# Patient Record
Sex: MALE | Race: WHITE | URBAN - METROPOLITAN AREA
[De-identification: names, ages, dates, MRNs, and addresses within clinical notes are randomized per-mention and may not be internally consistent; named-entity substitution may affect disease eponyms.]

---

## 2019-08-14 LAB
ALBUMIN SERPL-MCNC: 4.8 G/DL (ref 3.5–4.6)
ALP BLD-CCNC: 88 U/L (ref 35–104)
ALT SERPL-CCNC: 18 U/L (ref 0–41)
AST SERPL-CCNC: 20 U/L (ref 0–40)
BASOPHILS ABSOLUTE: 0.2 K/UL (ref 0–0.2)
BASOPHILS RELATIVE PERCENT: 2.2 %
BILIRUB SERPL-MCNC: 0.3 MG/DL (ref 0.2–0.7)
BILIRUBIN DIRECT: <0.2 MG/DL (ref 0–0.4)
BILIRUBIN, INDIRECT: ABNORMAL MG/DL (ref 0–0.6)
EOSINOPHILS ABSOLUTE: 0.2 K/UL (ref 0–0.7)
EOSINOPHILS RELATIVE PERCENT: 2.5 %
HCT VFR BLD CALC: 47.7 % (ref 42–52)
HEMOGLOBIN: 17 G/DL (ref 14–18)
LYMPHOCYTES ABSOLUTE: 3.3 K/UL (ref 1–4.8)
LYMPHOCYTES RELATIVE PERCENT: 34.3 %
MCH RBC QN AUTO: 33.3 PG (ref 27–31.3)
MCHC RBC AUTO-ENTMCNC: 35.6 % (ref 33–37)
MCV RBC AUTO: 93.4 FL (ref 80–100)
MONOCYTES ABSOLUTE: 0.8 K/UL (ref 0.2–0.8)
MONOCYTES RELATIVE PERCENT: 8.3 %
NEUTROPHILS ABSOLUTE: 5 K/UL (ref 1.4–6.5)
NEUTROPHILS RELATIVE PERCENT: 52.7 %
PDW BLD-RTO: 12.7 % (ref 11.5–14.5)
PLATELET # BLD: 318 K/UL (ref 130–400)
RBC # BLD: 5.1 M/UL (ref 4.7–6.1)
TOTAL PROTEIN: 7.8 G/DL (ref 6.3–8)
WBC # BLD: 9.5 K/UL (ref 4.8–10.8)

## 2019-10-01 ENCOUNTER — TELEPHONE (OUTPATIENT)
Dept: NEUROLOGY | Age: 49
End: 2019-10-01

## 2019-10-08 ENCOUNTER — TELEPHONE (OUTPATIENT)
Dept: NEUROLOGY | Age: 49
End: 2019-10-08

## 2019-10-09 RX ORDER — LEVETIRACETAM 500 MG/1
TABLET ORAL
Qty: 150 TABLET | Refills: 3 | Status: SHIPPED | OUTPATIENT
Start: 2019-10-09 | End: 2020-02-06 | Stop reason: SDUPTHER

## 2019-12-26 DIAGNOSIS — R56.9 SEIZURES (HCC): Primary | ICD-10-CM

## 2019-12-26 RX ORDER — CLOBAZAM 10 MG/1
TABLET ORAL
Qty: 90 TABLET | Refills: 1 | Status: SHIPPED | OUTPATIENT
Start: 2019-12-26 | End: 2020-02-12 | Stop reason: SDUPTHER

## 2019-12-26 RX ORDER — CLOBAZAM 10 MG/1
TABLET ORAL
Qty: 90 TABLET | Refills: 1 | Status: SHIPPED | OUTPATIENT
Start: 2019-12-26 | End: 2019-12-26 | Stop reason: CLARIF

## 2020-02-06 RX ORDER — LEVETIRACETAM 500 MG/1
TABLET ORAL
Qty: 150 TABLET | Refills: 3 | Status: SHIPPED | OUTPATIENT
Start: 2020-02-06 | End: 2020-02-12 | Stop reason: SDUPTHER

## 2020-02-06 NOTE — TELEPHONE ENCOUNTER
Requested Prescriptions     Pending Prescriptions Disp Refills    levETIRAcetam (KEPPRA) 500 MG tablet 150 tablet 3     Si and a half tablets by mouth twice daily

## 2020-02-12 ENCOUNTER — OFFICE VISIT (OUTPATIENT)
Dept: NEUROLOGY | Age: 50
End: 2020-02-12
Payer: COMMERCIAL

## 2020-02-12 VITALS — HEART RATE: 90 BPM | WEIGHT: 171.9 LBS | SYSTOLIC BLOOD PRESSURE: 145 MMHG | DIASTOLIC BLOOD PRESSURE: 94 MMHG

## 2020-02-12 PROBLEM — R56.9 SEIZURES (HCC): Status: ACTIVE | Noted: 2020-02-12

## 2020-02-12 PROCEDURE — 99214 OFFICE O/P EST MOD 30 MIN: CPT | Performed by: PSYCHIATRY & NEUROLOGY

## 2020-02-12 PROCEDURE — 4004F PT TOBACCO SCREEN RCVD TLK: CPT | Performed by: PSYCHIATRY & NEUROLOGY

## 2020-02-12 PROCEDURE — G8484 FLU IMMUNIZE NO ADMIN: HCPCS | Performed by: PSYCHIATRY & NEUROLOGY

## 2020-02-12 PROCEDURE — G8421 BMI NOT CALCULATED: HCPCS | Performed by: PSYCHIATRY & NEUROLOGY

## 2020-02-12 PROCEDURE — G8427 DOCREV CUR MEDS BY ELIG CLIN: HCPCS | Performed by: PSYCHIATRY & NEUROLOGY

## 2020-02-12 RX ORDER — LEVETIRACETAM 500 MG/1
TABLET ORAL
Qty: 180 TABLET | Refills: 3 | Status: SHIPPED | OUTPATIENT
Start: 2020-02-12 | End: 2020-07-07 | Stop reason: SDUPTHER

## 2020-02-12 RX ORDER — LAMOTRIGINE 200 MG/1
TABLET ORAL
COMMUNITY
Start: 2020-01-27 | End: 2020-03-24 | Stop reason: SDUPTHER

## 2020-02-12 RX ORDER — CLOBAZAM 10 MG/1
TABLET ORAL
Qty: 90 TABLET | Refills: 0 | Status: SHIPPED | OUTPATIENT
Start: 2020-02-12 | End: 2020-02-12

## 2020-02-12 RX ORDER — CLOBAZAM 10 MG/1
TABLET ORAL
Qty: 90 TABLET | Refills: 3 | Status: SHIPPED | OUTPATIENT
Start: 2020-02-12 | End: 2020-06-17 | Stop reason: SDUPTHER

## 2020-02-12 ASSESSMENT — ENCOUNTER SYMPTOMS
VOMITING: 0
BACK PAIN: 0
CHOKING: 0
NAUSEA: 0
SHORTNESS OF BREATH: 0
PHOTOPHOBIA: 0
TROUBLE SWALLOWING: 0

## 2020-02-12 NOTE — PROGRESS NOTES
Subjective:      Patient ID: Di Jaramillo is a 51 y.o. male who presents today for:  Chief Complaint   Patient presents with    Follow-up     Patient states he is having about 1-2 seizures still a month. Patient states he has been getting a migraine frequently they can last from 5hour till the next day at times. HPI 49-year right-handed gentleman with a history of intractable epilepsy. Patient has bilateral representation of focus and and nonoperative candidate for surgical intervention for epilepsy. Patient is now on 30 mg of Onfi and 1250 mg twice a day of Keppra and 200 mg twice a day of Lamictal.  He is not able to tolerate any other medications are high doses been tried on every medication available in the market. Pt had  One generalized seizure in December and others aremild partial seizures. His  main issues appears to be severe migraine headaches which he has not responded to any medications or tried Imitrex. He was on Aimovig only short-term. He did respond to Aimovig at least    No past medical history on file. No past surgical history on file.   Social History     Socioeconomic History    Marital status:      Spouse name: Not on file    Number of children: Not on file    Years of education: Not on file    Highest education level: Not on file   Occupational History    Not on file   Social Needs    Financial resource strain: Not on file    Food insecurity:     Worry: Not on file     Inability: Not on file    Transportation needs:     Medical: Not on file     Non-medical: Not on file   Tobacco Use    Smoking status: Never Smoker    Smokeless tobacco: Never Used   Substance and Sexual Activity    Alcohol use: Not on file    Drug use: Not on file    Sexual activity: Not on file   Lifestyle    Physical activity:     Days per week: Not on file     Minutes per session: Not on file    Stress: Not on file   Relationships    Social connections:     Talks on phone: Not on file Gets together: Not on file     Attends Sabianist service: Not on file     Active member of club or organization: Not on file     Attends meetings of clubs or organizations: Not on file     Relationship status: Not on file    Intimate partner violence:     Fear of current or ex partner: Not on file     Emotionally abused: Not on file     Physically abused: Not on file     Forced sexual activity: Not on file   Other Topics Concern    Not on file   Social History Narrative    Not on file     No family history on file. Allergies   Allergen Reactions    Zonisamide Other (See Comments)         Review of Systems   Constitutional: Negative for fever. HENT: Negative for ear pain, tinnitus and trouble swallowing. Eyes: Negative for photophobia and visual disturbance. Respiratory: Negative for choking and shortness of breath. Cardiovascular: Negative for chest pain and palpitations. Gastrointestinal: Negative for nausea and vomiting. Musculoskeletal: Negative for back pain, gait problem, joint swelling, myalgias, neck pain and neck stiffness. Neurological: Negative for dizziness, tremors, seizures, syncope, facial asymmetry, speech difficulty, weakness, light-headedness, numbness and headaches. Psychiatric/Behavioral: Negative for behavioral problems, confusion, hallucinations and sleep disturbance. Objective:   BP (!) 145/94 (Site: Left Upper Arm, Position: Sitting, Cuff Size: Medium Adult)   Pulse 90   Wt 171 lb 14.4 oz (78 kg)     Physical Exam  Vitals signs reviewed. Eyes:      Pupils: Pupils are equal, round, and reactive to light. Neck:      Musculoskeletal: Normal range of motion. Cardiovascular:      Rate and Rhythm: Normal rate and regular rhythm. Heart sounds: No murmur. Pulmonary:      Effort: Pulmonary effort is normal.      Breath sounds: Normal breath sounds. Musculoskeletal: Normal range of motion.    Neurological:      Mental Status: He is alert and oriented to medication we have tried him on multiple medications none of this is helped in terms of his side effects. He has occasional seizures and he had a generalized seizure in December. Therefore recommended that we increase the Keppra to 1500 mg twice a day and try and maximize this further if tolerated. Patient has had a complete mapping for surgical intervention is bilateral representation not a candidate for surgical interventions for epilepsy. We have to relocate this at some point in time to see if he can find one large focus to take care of. Patient has intractable migraines I recommended that we reenter the Meghann Donald and add Ubrelvi for backup as a rescue as he is not quite responsive to sumatriptan. Plan:      No orders of the defined types were placed in this encounter. Orders Placed This Encounter   Medications    DISCONTD: cloBAZam (ONFI) 10 MG TABS tablet     Sig: 3 tablets po at bedtime     Dispense:  90 tablet     Refill:  0    cloBAZam (ONFI) 10 MG TABS tablet     Sig: 3 tablets po at bedtime     Dispense:  90 tablet     Refill:  3       Return in about 4 months (around 6/12/2020).       Meli Hogan MD

## 2020-02-18 ENCOUNTER — TELEPHONE (OUTPATIENT)
Dept: NEUROLOGY | Age: 50
End: 2020-02-18

## 2020-03-25 RX ORDER — LAMOTRIGINE 200 MG/1
200 TABLET ORAL 2 TIMES DAILY
Qty: 60 TABLET | Refills: 5 | Status: SHIPPED | OUTPATIENT
Start: 2020-03-25 | End: 2020-08-20 | Stop reason: SDUPTHER

## 2020-06-17 ENCOUNTER — OFFICE VISIT (OUTPATIENT)
Dept: NEUROLOGY | Age: 50
End: 2020-06-17
Payer: COMMERCIAL

## 2020-06-17 VITALS — HEART RATE: 99 BPM | WEIGHT: 171 LBS | DIASTOLIC BLOOD PRESSURE: 95 MMHG | SYSTOLIC BLOOD PRESSURE: 135 MMHG

## 2020-06-17 DIAGNOSIS — R56.9 SEIZURES (HCC): ICD-10-CM

## 2020-06-17 PROBLEM — M20.011 MALLET FINGER OF RIGHT HAND: Status: ACTIVE | Noted: 2019-12-27

## 2020-06-17 PROBLEM — R73.01 IMPAIRED FASTING GLUCOSE: Status: ACTIVE | Noted: 2020-06-17

## 2020-06-17 PROBLEM — Z87.891 FORMER SMOKER: Status: ACTIVE | Noted: 2020-06-17

## 2020-06-17 PROBLEM — E78.2 MIXED HYPERLIPIDEMIA: Status: ACTIVE | Noted: 2020-06-17

## 2020-06-17 PROBLEM — J44.89 ASTHMA-CHRONIC OBSTRUCTIVE PULMONARY DISEASE OVERLAP SYNDROME (HCC): Status: ACTIVE | Noted: 2020-06-17

## 2020-06-17 PROBLEM — G43.719 INTRACTABLE CHRONIC MIGRAINE WITHOUT AURA AND WITHOUT STATUS MIGRAINOSUS: Status: ACTIVE | Noted: 2020-06-17

## 2020-06-17 PROBLEM — M25.641 FINGER STIFFNESS, RIGHT: Status: ACTIVE | Noted: 2019-12-27

## 2020-06-17 PROBLEM — G56.00 CARPAL TUNNEL SYNDROME: Status: ACTIVE | Noted: 2020-06-17

## 2020-06-17 PROBLEM — J44.9 ASTHMA-CHRONIC OBSTRUCTIVE PULMONARY DISEASE OVERLAP SYNDROME (HCC): Status: ACTIVE | Noted: 2020-06-17

## 2020-06-17 PROBLEM — G40.909 SEIZURE DISORDER (HCC): Status: ACTIVE | Noted: 2018-10-25

## 2020-06-17 LAB
ALBUMIN SERPL-MCNC: 4.7 G/DL (ref 3.5–4.6)
ALP BLD-CCNC: 89 U/L (ref 35–104)
ALT SERPL-CCNC: 22 U/L (ref 0–41)
AST SERPL-CCNC: 20 U/L (ref 0–40)
BASOPHILS ABSOLUTE: 0.1 K/UL (ref 0–0.2)
BASOPHILS RELATIVE PERCENT: 0.7 %
BILIRUB SERPL-MCNC: 0.3 MG/DL (ref 0.2–0.7)
BILIRUBIN DIRECT: <0.2 MG/DL (ref 0–0.4)
BILIRUBIN, INDIRECT: ABNORMAL MG/DL (ref 0–0.6)
EOSINOPHILS ABSOLUTE: 0.1 K/UL (ref 0–0.7)
EOSINOPHILS RELATIVE PERCENT: 1.1 %
HCT VFR BLD CALC: 55.7 % (ref 42–52)
HEMOGLOBIN: 18.7 G/DL (ref 14–18)
LYMPHOCYTES ABSOLUTE: 2.9 K/UL (ref 1–4.8)
LYMPHOCYTES RELATIVE PERCENT: 24.1 %
MCH RBC QN AUTO: 31.9 PG (ref 27–31.3)
MCHC RBC AUTO-ENTMCNC: 33.5 % (ref 33–37)
MCV RBC AUTO: 95.3 FL (ref 80–100)
MONOCYTES ABSOLUTE: 1.1 K/UL (ref 0.2–0.8)
MONOCYTES RELATIVE PERCENT: 9 %
NEUTROPHILS ABSOLUTE: 7.9 K/UL (ref 1.4–6.5)
NEUTROPHILS RELATIVE PERCENT: 65.1 %
PDW BLD-RTO: 12.6 % (ref 11.5–14.5)
PLATELET # BLD: 330 K/UL (ref 130–400)
RBC # BLD: 5.84 M/UL (ref 4.7–6.1)
TOTAL PROTEIN: 7.5 G/DL (ref 6.3–8)
WBC # BLD: 12.2 K/UL (ref 4.8–10.8)

## 2020-06-17 PROCEDURE — G8421 BMI NOT CALCULATED: HCPCS | Performed by: PSYCHIATRY & NEUROLOGY

## 2020-06-17 PROCEDURE — G8427 DOCREV CUR MEDS BY ELIG CLIN: HCPCS | Performed by: PSYCHIATRY & NEUROLOGY

## 2020-06-17 PROCEDURE — 1036F TOBACCO NON-USER: CPT | Performed by: PSYCHIATRY & NEUROLOGY

## 2020-06-17 PROCEDURE — 99214 OFFICE O/P EST MOD 30 MIN: CPT | Performed by: PSYCHIATRY & NEUROLOGY

## 2020-06-17 RX ORDER — CLOBAZAM 10 MG/1
TABLET ORAL
Qty: 90 TABLET | Refills: 3 | Status: SHIPPED | OUTPATIENT
Start: 2020-06-17 | End: 2020-08-20 | Stop reason: SDUPTHER

## 2020-06-17 RX ORDER — ERENUMAB-AOOE 140 MG/ML
INJECTION, SOLUTION SUBCUTANEOUS
COMMUNITY
Start: 2020-05-21

## 2020-06-17 RX ORDER — SILDENAFIL 25 MG/1
25 TABLET, FILM COATED ORAL PRN
COMMUNITY
Start: 2020-04-03

## 2020-06-17 ASSESSMENT — ENCOUNTER SYMPTOMS
VOMITING: 0
BACK PAIN: 0
CHOKING: 0
PHOTOPHOBIA: 0
TROUBLE SWALLOWING: 0
COLOR CHANGE: 0
SHORTNESS OF BREATH: 0
NAUSEA: 0

## 2020-06-17 NOTE — PROGRESS NOTES
BILITOT 0.3 08/14/2019    ALKPHOS 88 08/14/2019    AST 20 08/14/2019    ALT 18 08/14/2019     Lab Results   Component Value Date    PROTIME 11.8 04/03/2012    INR 1.1 04/03/2012     Lab Results   Component Value Date    TSH 1.152 04/03/2012     No results found for: TRIG, HDL, LDLCALC, LDLDIRECT, LABVLDL  No results found for: LABAMPH, BARBSCNU, LABBENZ, CANNAB, COCAINESCRN, LABMETH, OPIATESCREENURINE, PHENCYCLIDINESCREENURINE, PPXUR, ETOH  No results found for: LITHIUM, DILFRTOT, VALPROATE    Assessment:       Diagnosis Orders   1. Seizure disorder (HCC)     2. Seizures (HCC)  cloBAZam (ONFI) 10 MG TABS tablet    Lamotrigine Level    CBC Auto Differential    Hepatic Function Panel   3. Bilateral carpal tunnel syndrome     4. Intractable chronic migraine without aura and without status migrainosus     Intractable epilepsy with bilateral representation of seizures. Patient is nonsurgical.  We have been tried on multiple medications is now settled on Lamictal 20 mg 3 times a day with Keppra 1500 g twice a day and we had added clobazam.  He is on 30 mg somewhat higher dose than recommended but he is doing somewhat better with the seizure control in the last 4 months he is only had 2 almost grand mal seizures and for seizures which are very minimal.  He is not any side effects of the same. Recommended liver function tests and complete blood count today with the mixed the levels. Patient has intractable migraines and we continue him on Aimovig as well as Dexter Porch  Is just been 3 months we are not quite sure of the response but he feels better somewhat than his previous medications such as triptans. We will keep an eye on his symptoms for now  We await introduction of 2 other new seizure medications coming in the market this month or 2.       Plan:      Orders Placed This Encounter   Procedures    Lamotrigine Level     Standing Status:   Future     Standing Expiration Date:   6/17/2021    CBC Auto Differential Standing Status:   Future     Standing Expiration Date:   6/17/2021    Hepatic Function Panel     Standing Status:   Future     Standing Expiration Date:   6/17/2021     Orders Placed This Encounter   Medications    cloBAZam (ONFI) 10 MG TABS tablet     Sig: 3 tablets po at bedtime     Dispense:  90 tablet     Refill:  3       No follow-ups on file.       Nabil Gerber MD

## 2020-06-19 LAB — LAMOTRIGINE LEVEL: 12.8 UG/ML (ref 2.5–15)

## 2020-07-08 RX ORDER — LEVETIRACETAM 500 MG/1
TABLET ORAL
Qty: 180 TABLET | Refills: 3 | Status: SHIPPED | OUTPATIENT
Start: 2020-07-08 | End: 2020-11-06 | Stop reason: SDUPTHER

## 2020-08-19 ENCOUNTER — TELEPHONE (OUTPATIENT)
Dept: NEUROLOGY | Age: 50
End: 2020-08-19

## 2020-08-20 RX ORDER — CLOBAZAM 10 MG/1
TABLET ORAL
Qty: 90 TABLET | Refills: 3 | Status: SHIPPED | OUTPATIENT
Start: 2020-08-20 | End: 2020-12-18 | Stop reason: SDUPTHER

## 2020-08-20 RX ORDER — LAMOTRIGINE 200 MG/1
200 TABLET ORAL 2 TIMES DAILY
Qty: 60 TABLET | Refills: 5 | Status: SHIPPED | OUTPATIENT
Start: 2020-08-20 | End: 2021-02-19 | Stop reason: SDUPTHER

## 2020-08-20 RX ORDER — UBROGEPANT 50 MG/1
TABLET ORAL
Qty: 10 TABLET | Refills: 3 | Status: SHIPPED | OUTPATIENT
Start: 2020-08-20

## 2020-10-02 ENCOUNTER — TELEPHONE (OUTPATIENT)
Dept: NEUROLOGY | Age: 50
End: 2020-10-02

## 2020-10-02 NOTE — TELEPHONE ENCOUNTER
Submitted pa for aimovig via cover  My meds     [employer] is reviewing your PA request and will respond within 24 hours for Medicaid or up to 72 hours for non-Medicaid plans, based on the required timeframe determined by state or federal regulations. To check for an update later, open this request from your dashboard.       giovani

## 2020-10-19 NOTE — TELEPHONE ENCOUNTER
Mariela 1964       [facility]:[id number];IJSNJX:[id number]; Review Type:Prior Auth; Coverage Start Date:09/19/2020; Coverage End Date:10/19/2021;      Leander Wynn

## 2020-10-29 ENCOUNTER — OFFICE VISIT (OUTPATIENT)
Dept: NEUROLOGY | Age: 50
End: 2020-10-29
Payer: COMMERCIAL

## 2020-10-29 VITALS — SYSTOLIC BLOOD PRESSURE: 142 MMHG | HEART RATE: 82 BPM | DIASTOLIC BLOOD PRESSURE: 100 MMHG | WEIGHT: 175 LBS

## 2020-10-29 PROCEDURE — 99214 OFFICE O/P EST MOD 30 MIN: CPT | Performed by: PSYCHIATRY & NEUROLOGY

## 2020-10-29 PROCEDURE — G8421 BMI NOT CALCULATED: HCPCS | Performed by: PSYCHIATRY & NEUROLOGY

## 2020-10-29 PROCEDURE — 1036F TOBACCO NON-USER: CPT | Performed by: PSYCHIATRY & NEUROLOGY

## 2020-10-29 PROCEDURE — G8484 FLU IMMUNIZE NO ADMIN: HCPCS | Performed by: PSYCHIATRY & NEUROLOGY

## 2020-10-29 PROCEDURE — G8427 DOCREV CUR MEDS BY ELIG CLIN: HCPCS | Performed by: PSYCHIATRY & NEUROLOGY

## 2020-10-29 RX ORDER — FREMANEZUMAB-VFRM 225 MG/1.5ML
225 INJECTION SUBCUTANEOUS
Qty: 1.5 ML | Refills: 6 | Status: SHIPPED | OUTPATIENT
Start: 2020-10-29 | End: 2021-03-04

## 2020-10-29 ASSESSMENT — ENCOUNTER SYMPTOMS
TROUBLE SWALLOWING: 0
NAUSEA: 0
SHORTNESS OF BREATH: 0
VOMITING: 0
PHOTOPHOBIA: 0
BACK PAIN: 0
CHOKING: 0
COLOR CHANGE: 0

## 2020-10-29 NOTE — PROGRESS NOTES
Subjective:      Patient ID: Arsalan Kern is a 53 y.o. male who presents today for:  Chief Complaint   Patient presents with    Follow-up     Pt states that he had a seizure about 3 weeks ago where he lost his thoughts for about 30 seconds and came to and was fine.  Migraine     Pt has not had aimovig since september due to [facility] calling him and telling him that they where not going to send it anymore. There has been no change of insurance or anything at this point. Pt states that he has had about 5-6 migraines since he has not had the injection. With the injection he would get about 1-2 a month. HPI 71-year-old right-handed woman history of intractable seizure disorder with bilateral representation of epilepsy and not a surgical candidate. We have tried him on multiple medications and settled on with Onfi with Keppra and Lamictal.  The patient has had about 4 seizures since last seen one of them appeared to be almost a grand mal seizure. We continue to discuss Epidiolex which she did not use and we are now considering Xcopri. He would not like to change the medication Extavia 6 for migraine headaches and was on a bridge program for Aimovig which helped his headaches he was down from 6 headache days a month to 2 headache days and then they would not refill his prescription he tried [place] and [place] and he did not respond to this he felt that Tylenol was better    He does not want to consider any change in his medications and I will leave the decision to him as has been tried on every anticonvulsant available in the [facility]    No past medical history on file. No past surgical history on file.   Social History     Socioeconomic History    Marital status:      Spouse name: Not on file    Number of children: Not on file    Years of education: Not on file    Highest education level: Not on file   Occupational History    Not on file   Social Needs    Financial resource strain: Not on file  Food insecurity     Worry: Not on file     Inability: Not on file    Transportation needs     Medical: Not on file     Non-medical: Not on file   Tobacco Use    Smoking status: Never Smoker    Smokeless tobacco: Never Used   Substance and Sexual Activity    Alcohol use: Not on file    Drug use: Not on file    Sexual activity: Not on file   Lifestyle    Physical activity     Days per week: Not on file     Minutes per session: Not on file    Stress: Not on file   Relationships    Social connections     Talks on phone: Not on file     Gets together: Not on file     Attends Synagogue service: Not on file     Active member of club or organization: Not on file     Attends meetings of clubs or organizations: Not on file     Relationship status: Not on file    Intimate partner violence     Fear of current or ex partner: Not on file     Emotionally abused: Not on file     Physically abused: Not on file     Forced sexual activity: Not on file   Other Topics Concern    Not on file   Social History Narrative    Not on file     No family history on file. Allergies   Allergen Reactions    Zonisamide Other (See Comments)       Current Outpatient Medications   Medication Sig Dispense Refill    Fremanezumab-vfrm (AJOVY) 225 MG/1.5ML SOAJ Inject 225 mg into the skin every 30 days 1.5 mL 6    cloBAZam (ONFI) 10 MG TABS tablet 3 tablets po at bedtime 90 tablet 3    UBRELVY 50 MG TABS TAKE 1 TABLET BY MOUTH AT ONSET OF MIGRAINE 10 tablet 3    levETIRAcetam (KEPPRA) 500 MG tablet 3    tablet 3    sildenafil (VIAGRA) 25 MG tablet Take 25 mg by mouth as needed      lamoTRIgine (LAMICTAL) 200 MG tablet Take 1 tablet by mouth 2 times daily 60 tablet 5    AIMOVIG 140 MG/ML SOAJ ADM 1 ML SC Q 30 DAYS      Erenumab-aooe (AIMOVIG) 140 MG/ML SOAJ Inject 140 mg into the skin every 30 days 1 pen 11     No current facility-administered medications for this visit.           Review of Systems   Constitutional: Negative 06/17/2020    RBC 5.19 04/03/2012    HGB 18.7 06/17/2020    HCT 55.7 06/17/2020    MCV 95.3 06/17/2020    MCH 31.9 06/17/2020    MCHC 33.5 06/17/2020    RDW 12.6 06/17/2020     06/17/2020    MPV 7.8 04/03/2012     Lab Results   Component Value Date     04/03/2012    K 4.6 04/03/2012     04/03/2012    CO2 33 04/03/2012    BUN 6 04/03/2012    CREATININE 0.92 04/03/2012    GFRAA 109.9 04/03/2012    LABGLOM 90.8 04/03/2012    GLUCOSE 107 04/03/2012    PROT 7.5 06/17/2020    LABALBU 4.7 06/17/2020    LABALBU 5.0 04/03/2012    CALCIUM 9.8 04/03/2012    BILITOT 0.3 06/17/2020    ALKPHOS 89 06/17/2020    AST 20 06/17/2020    ALT 22 06/17/2020     Lab Results   Component Value Date    PROTIME 11.8 04/03/2012    INR 1.1 04/03/2012     Lab Results   Component Value Date    TSH 1.152 04/03/2012     No results found for: TRIG, HDL, LDLCALC, LDLDIRECT, LABVLDL  No results found for: LABAMPH, BARBSCNU, LABBENZ, CANNAB, COCAINESCRN, LABMETH, OPIATESCREENURINE, PHENCYCLIDINESCREENURINE, PPXUR, ETOH  No results found for: LITHIUM, DILFRTOT, VALPROATE    Assessment:       Diagnosis Orders   1. Seizure disorder ([facility] [address]     2. Intractable chronic migraine without aura and without status migrainosus     3. Bilateral carpal tunnel syndrome     Generalized seizure disorder with some response to Onfi. He is on higher dose of Onfi than recommended is on 30 mg and Lamictal and Keppra he is on Keppra 1500 mg twice a day maxed out and lamotrigine 200 mg twice a day. We have tried him on every medication available in the [place] he has bilateral representation of firing of units and therefore not a surgical candidate he has been evaluated for the same. He has just accepted that these are his seizures though we continue to discuss new options one of them was Epidiolex he did not want to do this and we did discuss Ellen Suarez today a new anticonvulsant there is just been released. He will think about this.   Neck soft intractable migraine headaches responsive to Aimovig though insurance company does not cover this anymore we have changed him to [address] as we can give him samples. We will see how he does on this medication he did not respond to Margie Nevaers has tried triptans in the past without response. We will keep him on the same medication review in a few months      Plan:      No orders of the defined types were placed in this encounter. Orders Placed This Encounter   Medications    Fremanezumab-vfrm (AJOVY) 225 MG/1.5ML SOAJ     Sig: Inject 225 mg into the skin every 30 days     Dispense:  1.5 mL     Refill:  6       Return in about 4 months (around 2/28/2021).       Tonny Muñoz MD

## 2020-11-03 ENCOUNTER — CLINICAL DOCUMENTATION (OUTPATIENT)
Dept: NEUROLOGY | Age: 50
End: 2020-11-03

## 2020-11-06 RX ORDER — LEVETIRACETAM 500 MG/1
TABLET ORAL
Qty: 180 TABLET | Refills: 3 | Status: SHIPPED | OUTPATIENT
Start: 2020-11-06 | End: 2021-03-04 | Stop reason: SDUPTHER

## 2020-11-19 RX ORDER — FREMANEZUMAB-VFRM 225 MG/1.5ML
225 INJECTION SUBCUTANEOUS
Qty: 1 PEN | Refills: 0 | COMMUNITY
Start: 2020-11-19 | End: 2020-12-19

## 2020-12-18 RX ORDER — CLOBAZAM 10 MG/1
TABLET ORAL
Qty: 90 TABLET | Refills: 3 | Status: SHIPPED | OUTPATIENT
Start: 2020-12-18 | End: 2021-04-19 | Stop reason: SDUPTHER

## 2021-02-19 RX ORDER — LAMOTRIGINE 200 MG/1
200 TABLET ORAL 2 TIMES DAILY
Qty: 60 TABLET | Refills: 5 | Status: SHIPPED | OUTPATIENT
Start: 2021-02-19 | End: 2021-08-19 | Stop reason: SDUPTHER

## 2021-03-04 ENCOUNTER — OFFICE VISIT (OUTPATIENT)
Dept: NEUROLOGY | Age: 51
End: 2021-03-04
Payer: COMMERCIAL

## 2021-03-04 VITALS — HEART RATE: 85 BPM | DIASTOLIC BLOOD PRESSURE: 86 MMHG | WEIGHT: 174.6 LBS | SYSTOLIC BLOOD PRESSURE: 132 MMHG

## 2021-03-04 DIAGNOSIS — G43.719 INTRACTABLE CHRONIC MIGRAINE WITHOUT AURA AND WITHOUT STATUS MIGRAINOSUS: ICD-10-CM

## 2021-03-04 DIAGNOSIS — G40.909 SEIZURE DISORDER (HCC): Primary | ICD-10-CM

## 2021-03-04 PROCEDURE — 1036F TOBACCO NON-USER: CPT | Performed by: PSYCHIATRY & NEUROLOGY

## 2021-03-04 PROCEDURE — G8427 DOCREV CUR MEDS BY ELIG CLIN: HCPCS | Performed by: PSYCHIATRY & NEUROLOGY

## 2021-03-04 PROCEDURE — G8421 BMI NOT CALCULATED: HCPCS | Performed by: PSYCHIATRY & NEUROLOGY

## 2021-03-04 PROCEDURE — G8484 FLU IMMUNIZE NO ADMIN: HCPCS | Performed by: PSYCHIATRY & NEUROLOGY

## 2021-03-04 PROCEDURE — 99214 OFFICE O/P EST MOD 30 MIN: CPT | Performed by: PSYCHIATRY & NEUROLOGY

## 2021-03-04 RX ORDER — LEVETIRACETAM 500 MG/1
TABLET ORAL
Qty: 180 TABLET | Refills: 3 | Status: SHIPPED | OUTPATIENT
Start: 2021-03-04 | End: 2021-07-02 | Stop reason: SDUPTHER

## 2021-03-04 ASSESSMENT — ENCOUNTER SYMPTOMS
NAUSEA: 0
TROUBLE SWALLOWING: 0
CHOKING: 0
VOMITING: 0
BACK PAIN: 0
SHORTNESS OF BREATH: 0
PHOTOPHOBIA: 0
COLOR CHANGE: 0

## 2021-03-04 NOTE — PROGRESS NOTES
Subjective:      Patient ID: Karina Crowder is a 53 y.o. male who presents today for:  Chief Complaint   Patient presents with    Follow-up     Pt states that he had a little seizure about 2 months go has not had one since. Pt states that he had taken medication that day and it was just a random one.  Migraine     Pt states states that he is getting about 10-12 a month still even with the aimovig. Pt states that when he gets a migraine he takes tylenol and does okay with it. HPI 80-year-old right-handed gentleman history of intractable epilepsy. Patient still continues to breakthrough a few seizures here and there sometimes is. We have gone through the whole cookbook of medications and he does not want to try any more different medications. We had mapping done he is bihemispheric not a surgical candidate either. He now continues on Lamictal 200 mg a day. He is also on Keppra 1500 mg twice a day. Also in the high dose of Onfi at 30 mg daily. Patient has been tried on multiple medications we discussed Epidiolex as well as Xcopri. His other issues appears to be intractable migraine headaches he is on Aimovig which is cut down his headaches by 50% we did try Ubrelvy and he did not respond to this. No past medical history on file. No past surgical history on file.   Social History     Socioeconomic History    Marital status:      Spouse name: Not on file    Number of children: Not on file    Years of education: Not on file    Highest education level: Not on file   Occupational History    Not on file   Social Needs    Financial resource strain: Not on file    Food insecurity     Worry: Not on file     Inability: Not on file    Transportation needs     Medical: Not on file     Non-medical: Not on file   Tobacco Use    Smoking status: Never Smoker    Smokeless tobacco: Never Used   Substance and Sexual Activity    Alcohol use: Not on file    Drug use: Not on file    Sexual activity: Not on file   Lifestyle    Physical activity     Days per week: Not on file     Minutes per session: Not on file    Stress: Not on file   Relationships    Social connections     Talks on phone: Not on file     Gets together: Not on file     Attends Scientologist service: Not on file     Active member of club or organization: Not on file     Attends meetings of clubs or organizations: Not on file     Relationship status: Not on file    Intimate partner violence     Fear of current or ex partner: Not on file     Emotionally abused: Not on file     Physically abused: Not on file     Forced sexual activity: Not on file   Other Topics Concern    Not on file   Social History Narrative    Not on file     No family history on file. Allergies   Allergen Reactions    Zonisamide Other (See Comments)       Current Outpatient Medications   Medication Sig Dispense Refill    levETIRAcetam (KEPPRA) 500 MG tablet 3    tablet 3    lamoTRIgine (LAMICTAL) 200 MG tablet Take 1 tablet by mouth 2 times daily 60 tablet 5    cloBAZam (ONFI) 10 MG TABS tablet 3 tablets po at bedtime 90 tablet 3    UBRELVY 50 MG TABS TAKE 1 TABLET BY MOUTH AT ONSET OF MIGRAINE 10 tablet 3    sildenafil (VIAGRA) 25 MG tablet Take 25 mg by mouth as needed      AIMOVIG 140 MG/ML SOAJ ADM 1 ML SC Q 30 DAYS      Erenumab-aooe (AIMOVIG) 140 MG/ML SOAJ Inject 140 mg into the skin every 30 days 1 pen 11     No current facility-administered medications for this visit. Review of Systems   Constitutional: Negative for fever. HENT: Negative for ear pain, tinnitus and trouble swallowing. Eyes: Negative for photophobia and visual disturbance. Respiratory: Negative for choking and shortness of breath. Cardiovascular: Negative for chest pain and palpitations. Gastrointestinal: Negative for nausea and vomiting. Musculoskeletal: Negative for back pain, gait problem, joint swelling, myalgias, neck pain and neck stiffness.    Skin: Negative for color change. Allergic/Immunologic: Negative for food allergies. Neurological: Negative for dizziness, tremors, seizures, syncope, facial asymmetry, speech difficulty, weakness, light-headedness, numbness and headaches. Psychiatric/Behavioral: Negative for behavioral problems, confusion, hallucinations and sleep disturbance. Objective:   /86 (Site: Left Upper Arm, Position: Sitting, Cuff Size: Medium Adult)   Pulse 85   Wt 174 lb 9.6 oz (79.2 kg)     Physical Exam  Vitals signs reviewed. Eyes:      Pupils: Pupils are equal, round, and reactive to light. Neck:      Musculoskeletal: Normal range of motion. Cardiovascular:      Rate and Rhythm: Normal rate and regular rhythm. Heart sounds: No murmur. Pulmonary:      Effort: Pulmonary effort is normal.      Breath sounds: Normal breath sounds. Abdominal:      General: Bowel sounds are normal.   Musculoskeletal: Normal range of motion. Skin:     General: Skin is warm. Neurological:      Mental Status: He is alert and oriented to person, place, and time. Cranial Nerves: No cranial nerve deficit. Sensory: No sensory deficit. Motor: No abnormal muscle tone. Coordination: Coordination normal.      Deep Tendon Reflexes: Reflexes are normal and symmetric. Babinski sign absent on the right side. Babinski sign absent on the left side. Psychiatric:         Mood and Affect: Mood normal.         No results found.     Lab Results   Component Value Date    WBC 12.2 06/17/2020    RBC 5.84 06/17/2020    RBC 5.19 04/03/2012    HGB 18.7 06/17/2020    HCT 55.7 06/17/2020    MCV 95.3 06/17/2020    MCH 31.9 06/17/2020    MCHC 33.5 06/17/2020    RDW 12.6 06/17/2020     06/17/2020    MPV 7.8 04/03/2012     Lab Results   Component Value Date     04/03/2012    K 4.6 04/03/2012     04/03/2012    CO2 33 04/03/2012    BUN 6 04/03/2012    CREATININE 0.92 04/03/2012    GFRAA 109.9 04/03/2012    LABGLOM 90.8 04/03/2012    GLUCOSE 107 04/03/2012    PROT 7.5 06/17/2020    LABALBU 4.7 06/17/2020    LABALBU 5.0 04/03/2012    CALCIUM 9.8 04/03/2012    BILITOT 0.3 06/17/2020    ALKPHOS 89 06/17/2020    AST 20 06/17/2020    ALT 22 06/17/2020     Lab Results   Component Value Date    PROTIME 11.8 04/03/2012    INR 1.1 04/03/2012     Lab Results   Component Value Date    TSH 1.152 04/03/2012     No results found for: TRIG, HDL, LDLCALC, LDLDIRECT, LABVLDL  No results found for: LABAMPH, BARBSCNU, LABBENZ, CANNAB, COCAINESCRN, LABMETH, OPIATESCREENURINE, PHENCYCLIDINESCREENURINE, PPXUR, ETOH  No results found for: LITHIUM, DILFRTOT, VALPROATE    Assessment:       Diagnosis Orders   1. Seizure disorder ([facility] [address]     2. Intractable chronic migraine without aura and without status migrainosus     Intractable epilepsy which is not responded to many medications he has been tried on every medication with side effect except he is now on lamotrigine and Keppra and a high dose of clobazam.  We continue to discuss Epidiolex and Zhen Carlin is not want to change any medication for now and he is okay with that type of seizures in amount he has. Safety always has been discussed with him. He has bihemispheric representation not a surgical candidate. Bactrim twice already. In the event that his seizure frequency changes we will consider Xcopri for him. Tractable migraine headaches which had not responded to any current medications and is now on Ajovy with some 50% response. There is the best has been. He did not respond to [place]. We will keep an eye on this and consider Nurtec in the future      Plan:      No orders of the defined types were placed in this encounter. Orders Placed This Encounter   Medications    levETIRAcetam (KEPPRA) 500 MG tablet     Sig: 3   BID     Dispense:  180 tablet     Refill:  3       No follow-ups on file.       Cassandra Calix MD

## 2021-03-08 RX ORDER — ERENUMAB-AOOE 140 MG/ML
140 INJECTION, SOLUTION SUBCUTANEOUS
Qty: 1 PEN | Refills: 11 | Status: SHIPPED | OUTPATIENT
Start: 2021-03-08 | End: 2022-03-21 | Stop reason: SDUPTHER

## 2021-04-19 DIAGNOSIS — R56.9 SEIZURES (HCC): ICD-10-CM

## 2021-04-19 RX ORDER — CLOBAZAM 10 MG/1
TABLET ORAL
Qty: 90 TABLET | Refills: 3 | Status: SHIPPED | OUTPATIENT
Start: 2021-04-19 | End: 2021-08-13 | Stop reason: SDUPTHER

## 2021-05-06 ENCOUNTER — TELEPHONE (OUTPATIENT)
Dept: NEUROLOGY | Age: 51
End: 2021-05-06

## 2021-05-06 NOTE — TELEPHONE ENCOUNTER
Pt called in stating that he has had a daily migraine for a week now. It is hurting his eye, temples, forehead and tingling through out the head. He is on [place] and he states that it does not seem to be helping. Is there anything else that can be given to help pt?

## 2021-05-11 RX ORDER — BUTALBITAL, ACETAMINOPHEN AND CAFFEINE 50; 325; 40 MG/1; MG/1; MG/1
1 TABLET ORAL 3 TIMES DAILY PRN
Qty: 21 TABLET | Refills: 0 | Status: SHIPPED | OUTPATIENT
Start: 2021-05-11 | End: 2021-05-18

## 2021-06-29 ENCOUNTER — TELEPHONE (OUTPATIENT)
Dept: NEUROLOGY | Age: 51
End: 2021-06-29

## 2021-06-29 NOTE — TELEPHONE ENCOUNTER
Patient is trying to adopt his niece and they need a letter stating that his epilepsy is not going to be detrimental to any of there healths      Please advise if okay to do

## 2021-06-30 PROBLEM — E55.9 VITAMIN D DEFICIENCY: Status: ACTIVE | Noted: 2021-06-30

## 2021-06-30 PROBLEM — I10 ESSENTIAL HYPERTENSION: Status: ACTIVE | Noted: 2020-11-06

## 2021-06-30 NOTE — TELEPHONE ENCOUNTER
Got clarification from dr Douglas Murguia. We are able to write a letter but it needs to state that he is not the main care giver to this child but he can be a care giver.

## 2021-06-30 NOTE — TELEPHONE ENCOUNTER
That will depend on the age of the patient and how much care he is going to give as he has epilepsy and he cannot take care for

## 2021-07-02 RX ORDER — LEVETIRACETAM 500 MG/1
TABLET ORAL
Qty: 180 TABLET | Refills: 3 | Status: SHIPPED | OUTPATIENT
Start: 2021-07-02 | End: 2021-11-01 | Stop reason: SDUPTHER

## 2021-07-08 ENCOUNTER — OFFICE VISIT (OUTPATIENT)
Dept: NEUROLOGY | Age: 51
End: 2021-07-08
Payer: COMMERCIAL

## 2021-07-08 VITALS — SYSTOLIC BLOOD PRESSURE: 138 MMHG | WEIGHT: 166.5 LBS | HEART RATE: 78 BPM | DIASTOLIC BLOOD PRESSURE: 88 MMHG

## 2021-07-08 DIAGNOSIS — G43.719 INTRACTABLE CHRONIC MIGRAINE WITHOUT AURA AND WITHOUT STATUS MIGRAINOSUS: ICD-10-CM

## 2021-07-08 DIAGNOSIS — G40.909 SEIZURE DISORDER (HCC): Primary | ICD-10-CM

## 2021-07-08 PROCEDURE — 1036F TOBACCO NON-USER: CPT | Performed by: PSYCHIATRY & NEUROLOGY

## 2021-07-08 PROCEDURE — G8421 BMI NOT CALCULATED: HCPCS | Performed by: PSYCHIATRY & NEUROLOGY

## 2021-07-08 PROCEDURE — 99214 OFFICE O/P EST MOD 30 MIN: CPT | Performed by: PSYCHIATRY & NEUROLOGY

## 2021-07-08 PROCEDURE — G8427 DOCREV CUR MEDS BY ELIG CLIN: HCPCS | Performed by: PSYCHIATRY & NEUROLOGY

## 2021-07-08 ASSESSMENT — ENCOUNTER SYMPTOMS
CHOKING: 0
VOMITING: 0
PHOTOPHOBIA: 0
BACK PAIN: 0
SHORTNESS OF BREATH: 0
NAUSEA: 0
TROUBLE SWALLOWING: 0
COLOR CHANGE: 0

## 2021-07-08 NOTE — PROGRESS NOTES
Subjective:      Patient ID: Frank Mauro is a 53 y.o. male who presents today for:  Chief Complaint   Patient presents with    Follow-up     Pt states that last seizure was about 2 months ago. He states that he was working and feels he got to hot and over did it and had a seizure. HPI 58-year-old right-handed gentleman with a known history of intractable epilepsy with bihemispheric representation which is nonoperative. Patient's been tried on multiple medications and continues on Keppra 1500 mg twice a day and Onfi 10 mg 3 tablets at bedtime is on somewhat higher dose. He has had side effects with many of the seizure medications. He still breakthrough seizures here and there. He has migraine headaches and uses butalbital when last seen we had recommended Aimovig and Ubrelvy. These are still listed on his list    Patient's headaches are much better Aimovig. No past medical history on file. No past surgical history on file. Social History     Socioeconomic History    Marital status:      Spouse name: Not on file    Number of children: Not on file    Years of education: Not on file    Highest education level: Not on file   Occupational History    Not on file   Tobacco Use    Smoking status: Never Smoker    Smokeless tobacco: Never Used   Substance and Sexual Activity    Alcohol use: Not on file    Drug use: Not on file    Sexual activity: Not on file   Other Topics Concern    Not on file   Social History Narrative    Not on file     Social Determinants of Health     Financial Resource Strain:     Difficulty of Paying Living Expenses:    Food Insecurity:     Worried About Running Out of Food in the Last Year:     [address] in the Last Year:    Transportation Needs:     Lack of Transportation (Medical):      Lack of Transportation (Non-Medical):    Physical Activity:     Days of Exercise per Week:     Minutes of Exercise per Session:    Stress:     Feeling of Stress : Social Connections:     Frequency of Communication with Friends and Family:     Frequency of Social Gatherings with Friends and Family:     Attends Yarsanism Services:     Active Member of Clubs or Organizations:     Attends Club or Organization Meetings:     Marital Status:    Intimate Partner Violence:     Fear of Current or Ex-Partner:     Emotionally Abused:     Physically Abused:     Sexually Abused:      No family history on file. Allergies   Allergen Reactions    Zonisamide Other (See Comments)       Current Outpatient Medications   Medication Sig Dispense Refill    levETIRAcetam (KEPPRA) 500 MG tablet 3    tablet 3    cloBAZam (ONFI) 10 MG TABS tablet 3 tablets po at bedtime 90 tablet 3    UBRELVY 50 MG TABS TAKE 1 TABLET BY MOUTH AT ONSET OF MIGRAINE 10 tablet 3    sildenafil (VIAGRA) 25 MG tablet Take 25 mg by mouth as needed      AIMOVIG 140 MG/ML SOAJ ADM 1 ML SC Q 30 DAYS      butalbital-acetaminophen-caffeine (FIORICET, ESGIC) -40 MG per tablet Take 1 tablet by mouth 3 times daily as needed for Headaches (Take one tablet 3 times a day as needed for migraines for 7 days.) 21 tablet 0    Erenumab-aooe (AIMOVIG) 140 MG/ML SOAJ Inject 140 mg into the skin every 30 days 1 pen 11    lamoTRIgine (LAMICTAL) 200 MG tablet Take 1 tablet by mouth 2 times daily 60 tablet 5     No current facility-administered medications for this visit. Review of Systems   Constitutional: Negative for fever. HENT: Negative for ear pain, tinnitus and trouble swallowing. Eyes: Negative for photophobia and visual disturbance. Respiratory: Negative for choking and shortness of breath. Cardiovascular: Negative for chest pain and palpitations. Gastrointestinal: Negative for nausea and vomiting. Musculoskeletal: Negative for back pain, gait problem, joint swelling, myalgias, neck pain and neck stiffness. Skin: Negative for color change.    Allergic/Immunologic: Negative for food allergies. Neurological: Negative for dizziness, tremors, seizures, syncope, facial asymmetry, speech difficulty, weakness, light-headedness, numbness and headaches. Psychiatric/Behavioral: Negative for behavioral problems, confusion, hallucinations and sleep disturbance. Objective:   /88 (Site: Left Upper Arm, Position: Sitting, Cuff Size: Medium Adult)   Pulse 78   Wt 166 lb 8 oz (75.5 kg)     Physical Exam  Vitals reviewed. Eyes:      Pupils: Pupils are equal, round, and reactive to light. Cardiovascular:      Rate and Rhythm: Normal rate and regular rhythm. Heart sounds: No murmur heard. Pulmonary:      Effort: Pulmonary effort is normal.      Breath sounds: Normal breath sounds. Abdominal:      General: Bowel sounds are normal.   Musculoskeletal:         General: Normal range of motion. Cervical back: Normal range of motion. Skin:     General: Skin is warm. Neurological:      Mental Status: He is alert and oriented to person, place, and time. Cranial Nerves: No cranial nerve deficit. Sensory: No sensory deficit. Motor: No abnormal muscle tone. Coordination: Coordination normal.      Deep Tendon Reflexes: Reflexes are normal and symmetric. Babinski sign absent on the right side. Babinski sign absent on the left side. Psychiatric:         Mood and Affect: Mood normal.         No results found.     Lab Results   Component Value Date    WBC 12.2 06/17/2020    RBC 5.84 06/17/2020    RBC 5.19 04/03/2012    HGB 18.7 06/17/2020    HCT 55.7 06/17/2020    MCV 95.3 06/17/2020    MCH 31.9 06/17/2020    MCHC 33.5 06/17/2020    RDW 12.6 06/17/2020     06/17/2020    MPV 7.8 04/03/2012     Lab Results   Component Value Date     04/03/2012    K 4.6 04/03/2012     04/03/2012    CO2 33 04/03/2012    BUN 6 04/03/2012    CREATININE 0.92 04/03/2012    GFRAA 109.9 04/03/2012    LABGLOM 90.8 04/03/2012    GLUCOSE 107 04/03/2012    PROT 7.5 06/17/2020 LABALBU 4.7 06/17/2020    LABALBU 5.0 04/03/2012    CALCIUM 9.8 04/03/2012    BILITOT 0.3 06/17/2020    ALKPHOS 89 06/17/2020    AST 20 06/17/2020    ALT 22 06/17/2020     Lab Results   Component Value Date    PROTIME 11.8 04/03/2012    INR 1.1 04/03/2012     Lab Results   Component Value Date    TSH 1.152 04/03/2012     No results found for: TRIG, HDL, LDLCALC, LDLDIRECT, LABVLDL  No results found for: LABAMPH, BARBSCNU, LABBENZ, CANNAB, COCAINESCRN, LABMETH, OPIATESCREENURINE, PHENCYCLIDINESCREENURINE, PPXUR, ETOH  No results found for: LITHIUM, DILFRTOT, VALPROATE    Assessment:       Diagnosis Orders   1. Seizure disorder ([facility] [address]     2. Intractable chronic migraine without aura and without status migrainosus     Generalized seizure disorder which is intractable. Patient been tried on many medications. He had brain mapping done and has bilateral representation and not a candidate for surgery. He had side effects with many medications now settled with lamotrigine 200 mg twice a day with Onfi 10 mg which he takes 3 at night and is on Keppra 15 mg twice a day. Is doing better with this combination except for occasional breakthrough seizures. 1 breakthrough seizures when he is working outside is very hard. Patient's other issues appears to be intractable migraine headaches. Patient is actually done very well with a combination of Aimovig and occasional use of Ubrelvy. As much as he takes Aimovig every month with more months now he has responded much better and it will continue to add on as that is what we have seen in the studies. Follow-up in 6 months      Plan:      No orders of the defined types were placed in this encounter. No orders of the defined types were placed in this encounter. No follow-ups on file.       Sharen Kawasaki, MD

## 2021-08-13 DIAGNOSIS — R56.9 SEIZURES (HCC): ICD-10-CM

## 2021-08-13 RX ORDER — CLOBAZAM 10 MG/1
TABLET ORAL
Qty: 90 TABLET | Refills: 3 | Status: SHIPPED | OUTPATIENT
Start: 2021-08-13 | End: 2021-12-13 | Stop reason: SDUPTHER

## 2021-08-19 RX ORDER — LAMOTRIGINE 200 MG/1
200 TABLET ORAL 2 TIMES DAILY
Qty: 60 TABLET | Refills: 5 | Status: SHIPPED | OUTPATIENT
Start: 2021-08-19 | End: 2022-02-15 | Stop reason: SDUPTHER

## 2021-08-19 NOTE — TELEPHONE ENCOUNTER
Patient is requesting medication refill.  Please approve or deny this request.    Rx requested:  Requested Prescriptions     Pending Prescriptions Disp Refills    lamoTRIgine (LAMICTAL) 200 MG tablet 60 tablet 5     Sig: Take 1 tablet by mouth 2 times daily         Last Office Visit:   7/8/2021      Next Visit Date:  Future Appointments   Date Time Provider Ricci Santos   1/6/2022  4:30 PM Gayla Wallace MD [facility]

## 2021-08-26 ENCOUNTER — TELEPHONE (OUTPATIENT)
Dept: NEUROLOGY | Age: 51
End: 2021-08-26

## 2021-08-26 NOTE — TELEPHONE ENCOUNTER
PATIENT CALLED AND STATES that he injected aimovig on 8/23/2021 and has had a migraine since injection and today he states that he had a seizure. Before today his last seizure was a couple months ago . Patient currently only takes amiovig and tylenol for migraines . Keppra, lamictal and onfi for seizures.      Please advise

## 2021-09-02 PROBLEM — N52.9 ERECTILE DYSFUNCTION: Status: ACTIVE | Noted: 2021-06-29

## 2021-09-02 NOTE — TELEPHONE ENCOUNTER
Please print while in [facility] office only! Thank you. Pt called in stating that his handiplasticard is  on 21 He says he wants it renewed, Im not seeing in the system that we have ever even done this for him previously, so I just want to check with you first to see if this is okay to pend. If it is okay please approve pended order. Thank you.

## 2021-09-28 ENCOUNTER — TELEPHONE (OUTPATIENT)
Dept: NEUROLOGY | Age: 51
End: 2021-09-28

## 2021-11-01 RX ORDER — LEVETIRACETAM 500 MG/1
1500 TABLET ORAL 2 TIMES DAILY
Qty: 180 TABLET | Refills: 5 | Status: SHIPPED | OUTPATIENT
Start: 2021-11-01 | End: 2022-04-28 | Stop reason: SDUPTHER

## 2021-12-10 ENCOUNTER — TELEPHONE (OUTPATIENT)
Dept: NEUROLOGY | Age: 51
End: 2021-12-10

## 2021-12-13 DIAGNOSIS — R56.9 SEIZURES (HCC): ICD-10-CM

## 2021-12-13 RX ORDER — CLOBAZAM 10 MG/1
TABLET ORAL
Qty: 90 TABLET | Refills: 3 | Status: SHIPPED | OUTPATIENT
Start: 2021-12-13 | End: 2022-04-11 | Stop reason: SDUPTHER

## 2022-01-06 ENCOUNTER — OFFICE VISIT (OUTPATIENT)
Dept: NEUROLOGY | Age: 52
End: 2022-01-06
Payer: COMMERCIAL

## 2022-01-06 VITALS
HEART RATE: 70 BPM | DIASTOLIC BLOOD PRESSURE: 79 MMHG | SYSTOLIC BLOOD PRESSURE: 122 MMHG | WEIGHT: 168 LBS | HEIGHT: 71 IN | BODY MASS INDEX: 23.52 KG/M2

## 2022-01-06 DIAGNOSIS — G40.909 SEIZURE DISORDER (HCC): Primary | ICD-10-CM

## 2022-01-06 DIAGNOSIS — G43.719 INTRACTABLE CHRONIC MIGRAINE WITHOUT AURA AND WITHOUT STATUS MIGRAINOSUS: ICD-10-CM

## 2022-01-06 PROCEDURE — G8427 DOCREV CUR MEDS BY ELIG CLIN: HCPCS | Performed by: PSYCHIATRY & NEUROLOGY

## 2022-01-06 PROCEDURE — 1036F TOBACCO NON-USER: CPT | Performed by: PSYCHIATRY & NEUROLOGY

## 2022-01-06 PROCEDURE — G8484 FLU IMMUNIZE NO ADMIN: HCPCS | Performed by: PSYCHIATRY & NEUROLOGY

## 2022-01-06 PROCEDURE — 99214 OFFICE O/P EST MOD 30 MIN: CPT | Performed by: PSYCHIATRY & NEUROLOGY

## 2022-01-06 PROCEDURE — 3017F COLORECTAL CA SCREEN DOC REV: CPT | Performed by: PSYCHIATRY & NEUROLOGY

## 2022-01-06 PROCEDURE — G8420 CALC BMI NORM PARAMETERS: HCPCS | Performed by: PSYCHIATRY & NEUROLOGY

## 2022-01-06 ASSESSMENT — ENCOUNTER SYMPTOMS
NAUSEA: 0
BACK PAIN: 0
TROUBLE SWALLOWING: 0
CHOKING: 0
PHOTOPHOBIA: 0
COLOR CHANGE: 0
SHORTNESS OF BREATH: 0
VOMITING: 0

## 2022-01-06 NOTE — PROGRESS NOTES
Subjective:      Patient ID: Tanesha Ross is a 49 y.o. male who presents today for:  Chief Complaint   Patient presents with    Follow-up     Follow-up; Seizures. Patient states that he is experiencing occasional seizures. Most recent seizure 1 month ago. Otherwise well with no new or worsening symptoms. HPI 49) gentleman history of intractable epilepsy which is bihemispheric and uncontrolled. Patient is on Lamictal 2 mg twice a day and Keppra 15 mg twice a day and clobazam 30 mg. Is maxed on the medication. We have tried him on multiple occasions and last we also discussed Xcopri and Epidiolex as well. Patient has considerable migraine headaches and we had recommended Aimovig with occasional use of [place] is helping. Since last seen patient actually is not had a generalized seizure he had a few brief minor ones and this is the best he has been. Is not any falls since trauma his headaches are better controlled. He denies any side effects. History reviewed. No pertinent past medical history. History reviewed. No pertinent surgical history. Social History     Socioeconomic History    Marital status:      Spouse name: Not on file    Number of children: Not on file    Years of education: Not on file    Highest education level: Not on file   Occupational History    Not on file   Tobacco Use    Smoking status: Never Smoker    Smokeless tobacco: Never Used   Substance and Sexual Activity    Alcohol use: Not on file    Drug use: Not on file    Sexual activity: Not on file   Other Topics Concern    Not on file   Social History Narrative    Not on file     Social Determinants of Health     Financial Resource Strain:     Difficulty of Paying Living Expenses: Not on file   Food Insecurity:     Worried About Running Out of Food in the Last Year: Not on file    Riley of Food in the Last Year: Not on file   Transportation Needs:     Lack of Transportation (Medical):  Not on file    Lack of Transportation (Non-Medical): Not on file   Physical Activity:     Days of Exercise per Week: Not on file    Minutes of Exercise per Session: Not on file   Stress:     Feeling of Stress : Not on file   Social Connections:     Frequency of Communication with Friends and Family: Not on file    Frequency of Social Gatherings with Friends and Family: Not on file    Attends Latter day Services: Not on file    Active Member of [address] [place] [address] or Organizations: Not on file    Attends Club or Organization Meetings: Not on file    Marital Status: Not on file   Intimate Partner Violence:     Fear of Current or Ex-Partner: Not on file    Emotionally Abused: Not on file    Physically Abused: Not on file    Sexually Abused: Not on file   Housing Stability:     Unable to Pay for Housing in the Last Year: Not on file    Number of [place] in the Last Year: Not on file    Unstable Housing in the Last Year: Not on file     History reviewed. No pertinent family history.   Allergies   Allergen Reactions    Zonisamide Other (See Comments)       Current Outpatient Medications   Medication Sig Dispense Refill    cloBAZam (ONFI) 10 MG TABS tablet 3 tablets po at bedtime 90 tablet 3    Handicap Placard MISC by Does not apply route EXP: 5 years 1 each 0    UBRELVY 50 MG TABS TAKE 1 TABLET BY MOUTH AT ONSET OF MIGRAINE 10 tablet 3    sildenafil (VIAGRA) 25 MG tablet Take 25 mg by mouth as needed      AIMOVIG 140 MG/ML SOAJ ADM 1 ML SC Q 30 DAYS      levETIRAcetam (KEPPRA) 500 MG tablet Take 3 tablets by mouth 2 times daily 180 tablet 5    lamoTRIgine (LAMICTAL) 200 MG tablet Take 1 tablet by mouth 2 times daily 60 tablet 5    butalbital-acetaminophen-caffeine (FIORICET, ESGIC) -40 MG per tablet Take 1 tablet by mouth 3 times daily as needed for Headaches (Take one tablet 3 times a day as needed for migraines for 7 days.) 21 tablet 0    Erenumab-aooe (AIMOVIG) 140 MG/ML SOAJ Inject 140 mg into the skin every 30 days 1 pen 11     No current facility-administered medications for this visit. Review of Systems   Constitutional: Negative for fever. HENT: Negative for ear pain, tinnitus and trouble swallowing. Eyes: Negative for photophobia and visual disturbance. Respiratory: Negative for choking and shortness of breath. Cardiovascular: Negative for chest pain and palpitations. Gastrointestinal: Negative for nausea and vomiting. Musculoskeletal: Negative for back pain, gait problem, joint swelling, myalgias, neck pain and neck stiffness. Skin: Negative for color change. Allergic/Immunologic: Negative for food allergies. Neurological: Negative for dizziness, tremors, seizures, syncope, facial asymmetry, speech difficulty, weakness, light-headedness, numbness and headaches. Psychiatric/Behavioral: Negative for behavioral problems, confusion, hallucinations and sleep disturbance. Objective:   /79 (Site: Left Upper Arm, Position: Sitting, Cuff Size: Large Adult)   Pulse 70   Ht 5' 11\" (1.803 m)   Wt 168 lb (76.2 kg)   BMI 23.43 kg/m²     Physical Exam  Vitals reviewed. Eyes:      Pupils: Pupils are equal, round, and reactive to light. Cardiovascular:      Rate and Rhythm: Normal rate and regular rhythm. Heart sounds: No murmur heard. Pulmonary:      Effort: Pulmonary effort is normal.      Breath sounds: Normal breath sounds. Abdominal:      General: Bowel sounds are normal.   Musculoskeletal:         General: Normal range of motion. Cervical back: Normal range of motion. Skin:     General: Skin is warm. Neurological:      Mental Status: He is alert and oriented to person, place, and time. Cranial Nerves: No cranial nerve deficit. Sensory: No sensory deficit. Motor: No abnormal muscle tone. Coordination: Coordination normal.      Deep Tendon Reflexes: Reflexes are normal and symmetric. Babinski sign absent on the right side.  Babinski sign absent on the left side. Psychiatric:         Mood and Affect: Mood normal.         No results found. Lab Results   Component Value Date    WBC 12.2 06/17/2020    RBC 5.84 06/17/2020    RBC 5.19 04/03/2012    HGB 18.7 06/17/2020    HCT 55.7 06/17/2020    MCV 95.3 06/17/2020    MCH 31.9 06/17/2020    MCHC 33.5 06/17/2020    RDW 12.6 06/17/2020     06/17/2020    MPV 7.8 04/03/2012     Lab Results   Component Value Date     04/03/2012    K 4.6 04/03/2012     04/03/2012    CO2 33 04/03/2012    BUN 6 04/03/2012    CREATININE 0.92 04/03/2012    GFRAA 109.9 04/03/2012    LABGLOM 90.8 04/03/2012    GLUCOSE 107 04/03/2012    PROT 7.5 06/17/2020    LABALBU 4.7 06/17/2020    LABALBU 5.0 04/03/2012    CALCIUM 9.8 04/03/2012    BILITOT 0.3 06/17/2020    ALKPHOS 89 06/17/2020    AST 20 06/17/2020    ALT 22 06/17/2020     Lab Results   Component Value Date    PROTIME 11.8 04/03/2012    INR 1.1 04/03/2012     Lab Results   Component Value Date    TSH 1.152 04/03/2012     No results found for: TRIG, HDL, LDLCALC, LDLDIRECT, LABVLDL  No results found for: LABAMPH, BARBSCNU, LABBENZ, CANNAB, COCAINESCRN, LABMETH, OPIATESCREENURINE, PHENCYCLIDINESCREENURINE, PPXUR, ETOH  No results found for: LITHIUM, DILFRTOT, VALPROATE    Assessment:       Diagnosis Orders   1. Seizure disorder ([facility] [address]     2. Intractable chronic migraine without aura and without status migrainosus     Intractable epilepsy with bihemispheric representation of seizures. Patient has had mapping done and he is not a surgical candidate and we have tried him on multiple medications in the past and is now settled down with Onfi 30 mg with Keppra 1500 mg twice a day and he takes lamotrigine 200 g twice a day. He has been on Dilantin in the past as well  And actually doing well with the combination of these medications and this is the best he has been for now. He has continued to have chronic migraine headaches and is on Aimovig with Ubrelvy.   This combination is also helping. We will keep him on this for now and continue to follow    Beto Quijano MD, [place], American Board of Psychiatry & Neurology  Board Certified in Vascular Neurology  Board Certified in Neuromuscular Medicine  Certified in [facility]:      No orders of the defined types were placed in this encounter. No orders of the defined types were placed in this encounter. No follow-ups on file.       Dave Tolentino MD

## 2022-02-15 RX ORDER — LAMOTRIGINE 200 MG/1
200 TABLET ORAL 2 TIMES DAILY
Qty: 60 TABLET | Refills: 5 | Status: SHIPPED | OUTPATIENT
Start: 2022-02-15 | End: 2022-08-15

## 2022-02-15 NOTE — TELEPHONE ENCOUNTER
Patient is requesting medication refill.  Please approve or deny this request.    Rx requested:  Requested Prescriptions     Pending Prescriptions Disp Refills    lamoTRIgine (LAMICTAL) 200 MG tablet 60 tablet 5     Sig: Take 1 tablet by mouth 2 times daily         Last Office Visit:   1/6/2022      Next Visit Date:  Future Appointments   Date Time Provider Ricci Santos   7/6/2022  4:30 PM Rickie Richter MD [facility]

## 2022-03-21 RX ORDER — ERENUMAB-AOOE 140 MG/ML
140 INJECTION, SOLUTION SUBCUTANEOUS
Qty: 1 PEN | Refills: 5 | Status: SHIPPED | OUTPATIENT
Start: 2022-03-21 | End: 2022-03-21 | Stop reason: SDUPTHER

## 2022-03-21 RX ORDER — ERENUMAB-AOOE 140 MG/ML
140 INJECTION, SOLUTION SUBCUTANEOUS
Qty: 1 PEN | Refills: 5 | Status: SHIPPED | OUTPATIENT
Start: 2022-03-21 | End: 2022-04-20

## 2022-04-11 DIAGNOSIS — R56.9 SEIZURES (HCC): ICD-10-CM

## 2022-04-11 RX ORDER — CLOBAZAM 10 MG/1
TABLET ORAL
Qty: 90 TABLET | Refills: 3 | Status: SHIPPED | OUTPATIENT
Start: 2022-04-11 | End: 2022-08-05 | Stop reason: SDUPTHER

## 2022-04-11 RX ORDER — CLOBAZAM 10 MG/1
TABLET ORAL
Qty: 90 TABLET | Refills: 3 | OUTPATIENT
Start: 2022-04-11

## 2022-04-28 RX ORDER — LEVETIRACETAM 500 MG/1
1500 TABLET ORAL 2 TIMES DAILY
Qty: 180 TABLET | Refills: 5 | Status: SHIPPED | OUTPATIENT
Start: 2022-04-28 | End: 2022-10-26

## 2022-06-13 ENCOUNTER — TELEPHONE (OUTPATIENT)
Dept: NEUROLOGY | Age: 52
End: 2022-06-13

## 2022-06-13 NOTE — TELEPHONE ENCOUNTER
Patient called states that for the last month and a half he has felt that the Nelbee is not working. He states he is having about 20 headache days a month. He wanted to know if Nelbee is known to stop working after about 2 years. Please advise.

## 2022-06-14 NOTE — TELEPHONE ENCOUNTER
It is not known that [facility] stops working in fact longer the patient is on Aimovig the better is supposed to get.   He may be switched to Bigg Diehl if covered

## 2022-06-15 RX ORDER — ATOGEPANT 60 MG/1
60 TABLET ORAL DAILY
Qty: 30 TABLET | Refills: 5 | Status: SHIPPED | OUTPATIENT
Start: 2022-06-15

## 2022-06-23 PROBLEM — G43.109 MIGRAINE WITH AURA AND WITHOUT STATUS MIGRAINOSUS, NOT INTRACTABLE: Status: ACTIVE | Noted: 2018-10-25

## 2022-06-23 PROBLEM — R68.82 LIBIDO, DECREASED: Status: ACTIVE | Noted: 2021-07-20

## 2022-06-23 RX ORDER — ATOGEPANT 60 MG/1
60 TABLET ORAL DAILY
Qty: 20 TABLET | Refills: 0 | COMMUNITY
Start: 2022-06-23

## 2022-06-30 PROBLEM — G40.909 SEIZURE DISORDER (HCC): Status: ACTIVE | Noted: 2022-06-30

## 2022-08-05 DIAGNOSIS — R56.9 SEIZURES (HCC): ICD-10-CM

## 2022-08-05 RX ORDER — CLOBAZAM 10 MG/1
TABLET ORAL
Qty: 90 TABLET | Refills: 3 | Status: SHIPPED | OUTPATIENT
Start: 2022-08-05 | End: 2022-12-09

## 2022-08-05 NOTE — TELEPHONE ENCOUNTER
Marquise Covarrubias is requesting a refill on the following medication(s):  Requested Prescriptions     Pending Prescriptions Disp Refills    cloBAZam (ONFI) 10 MG TABS tablet 90 tablet 3     Sig: 3 tablets po at bedtime       Last Visit Date (If Applicable):  1/9/2968    Next Visit Date:    12/7/2022

## 2022-08-15 RX ORDER — LAMOTRIGINE 200 MG/1
TABLET ORAL
Qty: 60 TABLET | Refills: 2 | Status: SHIPPED | OUTPATIENT
Start: 2022-08-15

## 2022-08-15 NOTE — TELEPHONE ENCOUNTER
Pharmacy is requesting medication refill.  Please approve or deny this request.    Rx requested:  Requested Prescriptions     Pending Prescriptions Disp Refills    lamoTRIgine (LAMICTAL) 200 MG tablet [Pharmacy Med Name: lamotrigine 200 mg tablet] 60 tablet 2     Sig: TAKE 1 TABLET BY MOUTH TWICE DAILY         Last Office Visit:   1/6/2022      Next Visit Date:  Future Appointments   Date Time Provider Ricci Santos   12/7/2022  3:30 PM Leti Genao MD [facility]

## 2022-10-26 RX ORDER — LEVETIRACETAM 500 MG/1
1500 TABLET ORAL 2 TIMES DAILY
Qty: 180 TABLET | Refills: 0 | Status: SHIPPED | OUTPATIENT
Start: 2022-10-26 | End: 2022-11-25 | Stop reason: SDUPTHER

## 2022-10-26 NOTE — TELEPHONE ENCOUNTER
Pharmacy is requesting medication refill.  Please approve or deny this request.    Rx requested:  Requested Prescriptions     Pending Prescriptions Disp Refills    levETIRAcetam (KEPPRA) 500 MG tablet [Pharmacy Med Name: levetiracetam 500 mg tablet] 180 tablet 5     Sig: Take 3 tablets by mouth 2 times daily         Last Office Visit:   1/6/2022      Next Visit Date:  Future Appointments   Date Time Provider Ricci Santos   12/7/2022  3:30 PM Sergio Ortega MD [facility]

## 2022-11-25 RX ORDER — LEVETIRACETAM 500 MG/1
1500 TABLET ORAL 2 TIMES DAILY
Qty: 180 TABLET | Refills: 3 | Status: SHIPPED | OUTPATIENT
Start: 2022-11-25 | End: 2022-12-25

## 2022-12-02 DIAGNOSIS — R56.9 SEIZURES (HCC): ICD-10-CM

## 2022-12-02 RX ORDER — CLOBAZAM 10 MG/1
TABLET ORAL
Qty: 90 TABLET | Refills: 3 | Status: SHIPPED | OUTPATIENT
Start: 2022-12-02 | End: 2023-04-07

## 2022-12-02 NOTE — TELEPHONE ENCOUNTER
Requested Prescriptions     Pending Prescriptions Disp Refills    cloBAZam (ONFI) 10 MG TABS tablet 90 tablet 3     Sig: 3 tablets po at bedtime

## 2022-12-07 ENCOUNTER — OFFICE VISIT (OUTPATIENT)
Dept: NEUROLOGY | Age: 52
End: 2022-12-07
Payer: COMMERCIAL

## 2022-12-07 VITALS
HEART RATE: 78 BPM | DIASTOLIC BLOOD PRESSURE: 80 MMHG | WEIGHT: 165.3 LBS | SYSTOLIC BLOOD PRESSURE: 121 MMHG | BODY MASS INDEX: 23.05 KG/M2

## 2022-12-07 DIAGNOSIS — G43.109 MIGRAINE WITH AURA AND WITHOUT STATUS MIGRAINOSUS, NOT INTRACTABLE: ICD-10-CM

## 2022-12-07 DIAGNOSIS — G40.909 SEIZURE DISORDER (HCC): ICD-10-CM

## 2022-12-07 DIAGNOSIS — G43.719 INTRACTABLE CHRONIC MIGRAINE WITHOUT AURA AND WITHOUT STATUS MIGRAINOSUS: ICD-10-CM

## 2022-12-07 DIAGNOSIS — G40.909 SEIZURE DISORDER (HCC): Primary | ICD-10-CM

## 2022-12-07 LAB
ALBUMIN SERPL-MCNC: 4.8 G/DL (ref 3.5–4.6)
ALP BLD-CCNC: 89 U/L (ref 35–104)
ALT SERPL-CCNC: 13 U/L (ref 0–41)
ANION GAP SERPL CALCULATED.3IONS-SCNC: 13 MEQ/L (ref 9–15)
AST SERPL-CCNC: 16 U/L (ref 0–40)
BILIRUB SERPL-MCNC: 0.3 MG/DL (ref 0.2–0.7)
BUN BLDV-MCNC: 11 MG/DL (ref 6–20)
CALCIUM SERPL-MCNC: 9.7 MG/DL (ref 8.5–9.9)
CHLORIDE BLD-SCNC: 103 MEQ/L (ref 95–107)
CO2: 26 MEQ/L (ref 20–31)
CREAT SERPL-MCNC: 0.97 MG/DL (ref 0.7–1.2)
GFR SERPL CREATININE-BSD FRML MDRD: >60 ML/MIN/{1.73_M2}
GLOBULIN: 2.7 G/DL (ref 2.3–3.5)
GLUCOSE BLD-MCNC: 76 MG/DL (ref 70–99)
POTASSIUM SERPL-SCNC: 4.1 MEQ/L (ref 3.4–4.9)
SODIUM BLD-SCNC: 142 MEQ/L (ref 135–144)
TOTAL PROTEIN: 7.5 G/DL (ref 6.3–8)

## 2022-12-07 PROCEDURE — 99214 OFFICE O/P EST MOD 30 MIN: CPT | Performed by: PSYCHIATRY & NEUROLOGY

## 2022-12-07 PROCEDURE — G8427 DOCREV CUR MEDS BY ELIG CLIN: HCPCS | Performed by: PSYCHIATRY & NEUROLOGY

## 2022-12-07 PROCEDURE — 3078F DIAST BP <80 MM HG: CPT | Performed by: PSYCHIATRY & NEUROLOGY

## 2022-12-07 PROCEDURE — G8484 FLU IMMUNIZE NO ADMIN: HCPCS | Performed by: PSYCHIATRY & NEUROLOGY

## 2022-12-07 PROCEDURE — 3017F COLORECTAL CA SCREEN DOC REV: CPT | Performed by: PSYCHIATRY & NEUROLOGY

## 2022-12-07 PROCEDURE — 3074F SYST BP LT 130 MM HG: CPT | Performed by: PSYCHIATRY & NEUROLOGY

## 2022-12-07 PROCEDURE — 1036F TOBACCO NON-USER: CPT | Performed by: PSYCHIATRY & NEUROLOGY

## 2022-12-07 PROCEDURE — G8420 CALC BMI NORM PARAMETERS: HCPCS | Performed by: PSYCHIATRY & NEUROLOGY

## 2022-12-07 ASSESSMENT — ENCOUNTER SYMPTOMS
COLOR CHANGE: 0
VOMITING: 0
CHOKING: 0
TROUBLE SWALLOWING: 0
PHOTOPHOBIA: 0
SHORTNESS OF BREATH: 0
BACK PAIN: 0
NAUSEA: 0

## 2022-12-07 NOTE — PROGRESS NOTES
Subjective:      Patient ID: Prince Morfin is a 47 y.o. male who presents today for:  Chief Complaint   Patient presents with    Follow-up     Pt states in the past 6 months he has had four minor seizures. Pt states he is still getting migraines everyday. 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 Patient is on Lamictal 200 mg twice a day and Keppra 1500 mg twice a day and clobazam 30 mg. Patient is maxed out on medications and we continue to discuss Xcopri and Epidiolex which she does not want to take due to expectation of side effects which is had with every medication. He still has intermittent occasional seizures. He has considerable migraines and we recommended Aimovig with occasional use of Ubrelvy. 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 From the seizure standpoint is doing much better is not any generalized seizures since last seen year ago though he has had 4 brief seizures but not generalized. This is the best he has ever been. No past medical history on file. No past surgical history on file.   Social History     Socioeconomic History    Marital status:      Spouse name: Not on file    Number of children: Not on file    Years of education: Not on file    Highest education level: Not on file   Occupational History    Not on file   Tobacco Use    Smoking status: Never    Smokeless tobacco: Never   Substance and Sexual Activity    Alcohol use: Not on file    Drug use: Not on file    Sexual activity: Not on file   Other Topics Concern    Not on file   Social History Narrative    Not on file     Social Determinants of Health     Financial Resource Strain: Not on file   Food Insecurity: Not on file Transportation Needs: Not on file   Physical Activity: Not on file   Stress: Not on file   Social Connections: Not on file   Intimate Partner Violence: Not on file   Housing Stability: Not on file     No family history on file. Allergies   Allergen Reactions    Zonisamide Other (See Comments)       Current Outpatient Medications   Medication Sig Dispense Refill    cloBAZam (ONFI) 10 MG TABS tablet 3 tablets po at bedtime 90 tablet 3    levETIRAcetam (KEPPRA) 500 MG tablet Take 3 tablets by mouth 2 times daily 180 tablet 3    lamoTRIgine (LAMICTAL) 200 MG tablet TAKE 1 TABLET BY MOUTH TWICE DAILY 60 tablet 2    Handicap Placard MISC by Does not apply route EXP: 5 years 1 each 0    sildenafil (VIAGRA) 25 MG tablet Take 25 mg by mouth as needed      Atogepant (QULIPTA) 60 MG TABS Take 60 mg by mouth daily 5 SAMPLES GIVEN  LOT: [id number]  EXP: 11/23 (Patient not taking: Reported on 12/7/2022) 20 tablet 0    Atogepant (QULIPTA) 60 MG TABS Take 60 mg by mouth daily (Patient not taking: Reported on 12/7/2022) 30 tablet 5    Erenumab-aooe (AIMOVIG) 140 MG/ML SOAJ Inject 140 mg into the skin every 30 days (Patient not taking: Reported on 12/7/2022) 1 pen 5    butalbital-acetaminophen-caffeine (FIORICET, ESGIC) -40 MG per tablet Take 1 tablet by mouth 3 times daily as needed for Headaches (Take one tablet 3 times a day as needed for migraines for 7 days.) (Patient not taking: Reported on 12/7/2022) 21 tablet 0    UBRELVY 50 MG TABS TAKE 1 TABLET BY MOUTH AT ONSET OF MIGRAINE (Patient not taking: Reported on 12/7/2022) 10 tablet 3    AIMOVIG 140 MG/ML SOAJ ADM 1 ML SC Q 30 DAYS (Patient not taking: Reported on 12/7/2022)       No current facility-administered medications for this visit. Review of Systems   Constitutional:  Negative for fever. HENT:  Negative for ear pain, tinnitus and trouble swallowing. Eyes:  Negative for photophobia and visual disturbance.    Respiratory:  Negative for choking and shortness of breath. Cardiovascular:  Negative for chest pain and palpitations. Gastrointestinal:  Negative for nausea and vomiting. Musculoskeletal:  Negative for back pain, gait problem, joint swelling, myalgias, neck pain and neck stiffness. Skin:  Negative for color change. Allergic/Immunologic: Negative for food allergies. Neurological:  Positive for seizures and headaches. Negative for dizziness, tremors, syncope, facial asymmetry, speech difficulty, weakness, light-headedness and numbness. Psychiatric/Behavioral:  Negative for behavioral problems, confusion, hallucinations and sleep disturbance. Objective:   /80 (Site: Right Upper Arm, Position: Sitting, Cuff Size: Medium Adult)   Pulse 78   Wt 165 lb 4.8 oz (75 kg)   BMI 23.05 kg/m²     Physical Exam  Vitals reviewed. Eyes:      Pupils: Pupils are equal, round, and reactive to light. Cardiovascular:      Rate and Rhythm: Normal rate and regular rhythm. Heart sounds: No murmur heard. Pulmonary:      Effort: Pulmonary effort is normal.      Breath sounds: Normal breath sounds. Abdominal:      General: Bowel sounds are normal.   Musculoskeletal:         General: Normal range of motion. Cervical back: Normal range of motion. Skin:     General: Skin is warm. Neurological:      Mental Status: He is alert and oriented to person, place, and time. Cranial Nerves: No cranial nerve deficit. Sensory: No sensory deficit. Motor: No abnormal muscle tone. Coordination: Coordination normal.      Deep Tendon Reflexes: Reflexes are normal and symmetric. Babinski sign absent on the right side. Babinski sign absent on the left side. Psychiatric:         Mood and Affect: Mood normal.       No results found.     Lab Results   Component Value Date/Time    WBC 12.2 06/17/2020 05:00 PM    RBC 5.84 06/17/2020 05:00 PM    RBC 5.19 04/03/2012 06:55 PM    HGB 18.7 06/17/2020 05:00 PM    HCT 55.7 06/17/2020 05:00 PM MCV 95.3 06/17/2020 05:00 PM    MCH 31.9 06/17/2020 05:00 PM    MCHC 33.5 06/17/2020 05:00 PM    RDW 12.6 06/17/2020 05:00 PM     06/17/2020 05:00 PM    MPV 7.8 04/03/2012 06:55 PM     Lab Results   Component Value Date/Time     04/03/2012 06:55 PM    K 4.6 04/03/2012 06:55 PM     04/03/2012 06:55 PM    CO2 33 04/03/2012 06:55 PM    BUN 6 04/03/2012 06:55 PM    CREATININE 0.92 04/03/2012 06:55 PM    GFRAA 109.9 04/03/2012 06:55 PM    LABGLOM 90.8 04/03/2012 06:55 PM    GLUCOSE 107 04/03/2012 06:55 PM    PROT 7.5 06/17/2020 05:00 PM    LABALBU 4.7 06/17/2020 05:00 PM    LABALBU 5.0 04/03/2012 06:55 PM    CALCIUM 9.8 04/03/2012 06:55 PM    BILITOT 0.3 06/17/2020 05:00 PM    ALKPHOS 89 06/17/2020 05:00 PM    AST 20 06/17/2020 05:00 PM    ALT 22 06/17/2020 05:00 PM     Lab Results   Component Value Date/Time    PROTIME 11.8 04/03/2012 06:55 PM    INR 1.1 04/03/2012 06:55 PM     Lab Results   Component Value Date/Time    TSH 1.152 04/03/2012 06:55 PM     No results found for: TRIG, HDL, LDLCALC, LDLDIRECT, LABVLDL  No results found for: Jonathan Ruslan, LABBENZ, CANNAB, COCAINESCRN, LABMETH, OPIATESCREENURINE, PHENCYCLIDINESCREENURINE, PPXUR, ETOH  No results found for: LITHIUM, DILFRTOT, VALPROATE    Assessment:       Diagnosis Orders   1. Seizure disorder (HCC)  Lamotrigine Level    Pain Management Drug Screen    Comprehensive Metabolic Panel      2. Intractable chronic migraine without aura and without status migrainosus        3. Migraine with aura and without status migrainosus, not intractable          Is epilepsy with very difficult to to control seizures. Patient's had complete mapping done a has bihemispheric did not surgical.  We have tried to manage his medications failed many of the medications he is now on Lamictal 200 mg twice a day and Keppra 1500 mg twice a day and continues on clobazam at a higher dose of 30 mg. This is the best he has been in his had for brief seizures.   This is not quite generalized. I have not seen him for a year and this is the best he has been since the last year. No side effects are reported. Next patient is aware that he is not likely to be able to drive unless he is seizure-free for at least 6 months to a year. He is aware of this and very understanding about this. Will arrange for drug screen today to see what his levels are      Beto ZAYNAB Tatum MD, [address], American Board of Psychiatry & Neurology  Board Certified in Vascular Neurology  Board Certified in Neuromuscular Medicine  Certified in [address] [place] [address] [place]:      Orders Placed This Encounter   Procedures    Lamotrigine Level     Standing Status:   Future     Standing Expiration Date:   12/7/2023    Pain Management Drug Screen     Standing Status:   Future     Standing Expiration Date:   12/7/2023    Comprehensive Metabolic Panel     Standing Status:   Future     Standing Expiration Date:   12/7/2023     No orders of the defined types were placed in this encounter. No follow-ups on file.       Carlos Booth MD

## 2022-12-09 LAB — LAMOTRIGINE LEVEL: 13.4 UG/ML (ref 3–15)

## 2022-12-09 RX ORDER — LAMOTRIGINE 200 MG/1
TABLET ORAL
Qty: 60 TABLET | Refills: 2 | Status: SHIPPED | OUTPATIENT
Start: 2022-12-09

## 2022-12-09 NOTE — TELEPHONE ENCOUNTER
Requested Prescriptions     Pending Prescriptions Disp Refills    lamoTRIgine (LAMICTAL) 200 MG tablet 60 tablet 2     Sig: TAKE 1 TABLET BY MOUTH TWICE DAILY

## 2022-12-11 LAB
6-ACETYLMORPHINE: NOT DETECTED
7-AMINOCLONAZEPAM: NOT DETECTED
ALPHA-OH-ALPRAZOLAM: NOT DETECTED
ALPHA-OH-MIDAZOLAM, URINE: NOT DETECTED
ALPRAZOLAM: NOT DETECTED
AMPHETAMINE: NOT DETECTED
BARBITURATES: NOT DETECTED
BENZOYLECGONINE: NOT DETECTED
BUPRENORPHINE: NOT DETECTED
CARISOPRODOL: NOT DETECTED
CLONAZEPAM: NOT DETECTED
CODEINE: NOT DETECTED
CREATININE URINE: 237.6 MG/DL (ref 20–400)
DIAZEPAM: NOT DETECTED
EER PAIN MGT DRUG PANEL, HIGH RES/EMIT U: NORMAL
ETHYL GLUCURONIDE: NOT DETECTED
FENTANYL: NOT DETECTED
GABAPENTIN: NOT DETECTED
HYDROCODONE: NOT DETECTED
HYDROMORPHONE: NOT DETECTED
LORAZEPAM: NOT DETECTED
MARIJUANA METABOLITE: PRESENT
MDA: NOT DETECTED
MDEA: NOT DETECTED
MDMA URINE: NOT DETECTED
MEPERIDINE: NOT DETECTED
METHADONE: NOT DETECTED
METHAMPHETAMINE: NOT DETECTED
METHYLPHENIDATE: NOT DETECTED
MIDAZOLAM: NOT DETECTED
MORPHINE: NOT DETECTED
NALOXONE: NOT DETECTED
NORBUPRENORPHINE, FREE: NOT DETECTED
NORDIAZEPAM: NOT DETECTED
NORFENTANYL: NOT DETECTED
NORHYDROCODONE, URINE: NOT DETECTED
NOROXYCODONE: NOT DETECTED
NOROXYMORPHONE, URINE: NOT DETECTED
OXAZEPAM: NOT DETECTED
OXYCODONE: NOT DETECTED
OXYMORPHONE: NOT DETECTED
PAIN MANAGEMENT DRUG PANEL: NORMAL
PCP: NOT DETECTED
PHENTERMINE: NOT DETECTED
PREGABALIN: NOT DETECTED
TAPENTADOL, URINE: NOT DETECTED
TAPENTADOL-O-SULFATE, URINE: NOT DETECTED
TEMAZEPAM: NOT DETECTED
TRAMADOL: NOT DETECTED
ZOLPIDEM: NOT DETECTED

## 2023-03-10 RX ORDER — LAMOTRIGINE 200 MG/1
TABLET ORAL
Qty: 60 TABLET | Refills: 2 | Status: SHIPPED | OUTPATIENT
Start: 2023-03-10

## 2023-03-27 RX ORDER — LEVETIRACETAM 500 MG/1
1500 TABLET ORAL 2 TIMES DAILY
Qty: 180 TABLET | Refills: 3 | Status: SHIPPED | OUTPATIENT
Start: 2023-03-27 | End: 2023-04-26

## 2023-03-27 NOTE — TELEPHONE ENCOUNTER
Pt is requesting medication refill.  Please approve or deny this request.    Rx requested:  Requested Prescriptions     Pending Prescriptions Disp Refills    levETIRAcetam (KEPPRA) 500 MG tablet 180 tablet 3     Sig: Take 3 tablets by mouth 2 times daily         Last Office Visit:   12/7/2022      Next Visit Date:  Future Appointments   Date Time Provider Ricci Santos   6/7/2023  3:45 PM MD [facility] Neurology -

## 2023-04-03 DIAGNOSIS — R56.9 SEIZURES (HCC): ICD-10-CM

## 2023-04-03 RX ORDER — CLOBAZAM 10 MG/1
TABLET ORAL
Qty: 90 TABLET | Refills: 3 | Status: SHIPPED | OUTPATIENT
Start: 2023-04-03 | End: 2023-08-07

## 2023-04-03 NOTE — TELEPHONE ENCOUNTER
Patient is requesting medication refill.  Please approve or deny this request.    Rx requested:  Requested Prescriptions     Pending Prescriptions Disp Refills    cloBAZam (ONFI) 10 MG TABS tablet 90 tablet 3     Sig: 3 tablets po at bedtime         Last Office Visit:   12/7/2022      Next Visit Date:  Future Appointments   Date Time Provider Ricci Santos   6/7/2023  3:45 PM MD Adam Álvarez Neurology -

## 2023-05-11 NOTE — TELEPHONE ENCOUNTER
Pharmacy is requesting medication refill.  Please approve or deny this request.    Rx requested:  Requested Prescriptions     Pending Prescriptions Disp Refills    lamoTRIgine (LAMICTAL) 200 MG tablet [Pharmacy Med Name: lamotrigine 200 mg tablet] 60 tablet 2     Sig: TAKE 1 TABLET BY MOUTH TWICE DAILY         Last Office Visit:   12/7/2022      Next Visit Date:  Future Appointments   Date Time Provider Ricci Santos   6/7/2023  3:45 PM MD Mike Dahl [facility] Neurology -

## 2023-05-12 RX ORDER — LAMOTRIGINE 200 MG/1
TABLET ORAL
Qty: 60 TABLET | Refills: 3 | Status: SHIPPED | OUTPATIENT
Start: 2023-05-12

## 2023-07-25 RX ORDER — LEVETIRACETAM 500 MG/1
1500 TABLET ORAL 2 TIMES DAILY
Qty: 180 TABLET | Refills: 0 | Status: SHIPPED | OUTPATIENT
Start: 2023-07-25 | End: 2023-08-24

## 2023-07-25 NOTE — TELEPHONE ENCOUNTER
Patient is requesting medication refill. Please approve or deny this request. He states he only has enough medication for tonight's dose.      Rx requested:  Requested Prescriptions     Pending Prescriptions Disp Refills    levETIRAcetam (KEPPRA) 500 MG tablet 180 tablet 0     Sig: Take 3 tablets by mouth 2 times daily         Last Office Visit:   Visit date not found      Next Visit Date:  Future Appointments   Date Time Provider [address]   12/6/2023  3:45 PM MD Pelon Mohr Neurology -

## 2023-07-31 DIAGNOSIS — R56.9 SEIZURES (HCC): ICD-10-CM

## 2023-07-31 RX ORDER — CLOBAZAM 10 MG/1
TABLET ORAL
Qty: 90 TABLET | Refills: 3 | Status: SHIPPED | OUTPATIENT
Start: 2023-07-31 | End: 2023-12-04

## 2023-07-31 NOTE — TELEPHONE ENCOUNTER
Patient is requesting medication refill.  Please approve or deny this request.    Rx requested:  Requested Prescriptions     Pending Prescriptions Disp Refills    cloBAZam (ONFI) 10 MG TABS tablet 90 tablet 3     Sig: 3 tablets po at bedtime         Last Office Visit:   6/7/2023      Next Visit Date:  Future Appointments   Date Time Provider [address]   12/6/2023  3:45 PM Dhruv Landis Friends, MD [facility] Neurology -

## 2023-08-23 RX ORDER — LEVETIRACETAM 500 MG/1
1500 TABLET ORAL 2 TIMES DAILY
Qty: 180 TABLET | Refills: 4 | Status: SHIPPED | OUTPATIENT
Start: 2023-08-23 | End: 2024-01-20

## 2023-10-09 RX ORDER — LAMOTRIGINE 200 MG/1
200 TABLET ORAL 2 TIMES DAILY
Qty: 60 TABLET | Refills: 5 | Status: SHIPPED | OUTPATIENT
Start: 2023-10-09

## 2023-10-09 NOTE — TELEPHONE ENCOUNTER
requesting medication refill.  Please approve or deny this request.    Rx requested:  Requested Prescriptions     Pending Prescriptions Disp Refills    lamoTRIgine (LAMICTAL) 200 MG tablet 60 tablet 5     Sig: Take 1 tablet by mouth 2 times daily         Last Office Visit:   6/7/2023      Next Visit Date:  Future Appointments   Date Time Provider [facility] [address]   12/6/2023  3:45 PM MD Nory Aquino Neurology -

## 2023-11-28 DIAGNOSIS — R56.9 SEIZURES (HCC): ICD-10-CM

## 2023-11-28 RX ORDER — CLOBAZAM 10 MG/1
30 TABLET ORAL NIGHTLY
Qty: 90 TABLET | Refills: 3 | Status: SHIPPED | OUTPATIENT
Start: 2023-11-28 | End: 2024-03-27

## 2023-11-28 NOTE — TELEPHONE ENCOUNTER
requesting medication refill.  Please approve or deny this request.    Rx requested:  Requested Prescriptions     Pending Prescriptions Disp Refills    cloBAZam (ONFI) 10 MG TABS tablet 90 tablet 3     Sig: 3 tablets po at bedtime         Last Office Visit:   6/7/2023      Next Visit Date:  Future Appointments   Date Time Provider [facility] [address]   12/6/2023  3:45 PM Jocelynn Yancey MD [facility] Neurology -

## 2024-01-22 RX ORDER — LEVETIRACETAM 500 MG/1
1500 TABLET ORAL 2 TIMES DAILY
Qty: 180 TABLET | Refills: 4 | Status: SHIPPED | OUTPATIENT
Start: 2024-01-22 | End: 2024-06-20

## 2024-01-22 NOTE — TELEPHONE ENCOUNTER
Patient is requesting medication refill. Please approve or deny this request.    Rx requested:  Requested Prescriptions     Pending Prescriptions Disp Refills    levETIRAcetam (KEPPRA) 500 MG tablet 180 tablet 4     Sig: Take 3 tablets by mouth 2 times daily         Last Office Visit:   6/7/2023      Next Visit Date:  Future Appointments   Date Time Provider Department Center   3/28/2024  3:15 PM Beto Berger MD [place] [facility] Neurology -

## 2024-03-21 DIAGNOSIS — R56.9 SEIZURES (HCC): ICD-10-CM

## 2024-03-21 RX ORDER — CLOBAZAM 10 MG/1
30 TABLET ORAL NIGHTLY
Qty: 90 TABLET | Refills: 3 | Status: SHIPPED | OUTPATIENT
Start: 2024-03-21 | End: 2024-07-19

## 2024-03-21 NOTE — TELEPHONE ENCOUNTER
Patient is  requesting medication refill. Please approve or deny this request.    Rx requested:  Requested Prescriptions     Pending Prescriptions Disp Refills    cloBAZam (ONFI) 10 MG TABS tablet 90 tablet 3     Sig: Take 3 tablets by mouth nightly for 120 days. Max Daily Amount: 30 mg         Last Office Visit:   6/7/2023      Next Visit Date:  Future Appointments   Date Time Provider Department Center   3/22/2024 11:00 AM Beto Berger MD [place] [facility] Neurology -

## 2024-03-22 ENCOUNTER — OFFICE VISIT (OUTPATIENT)
Dept: NEUROLOGY | Age: 54
End: 2024-03-22
Payer: COMMERCIAL

## 2024-03-22 VITALS
BODY MASS INDEX: 23.01 KG/M2 | WEIGHT: 165 LBS | HEART RATE: 84 BPM | SYSTOLIC BLOOD PRESSURE: 122 MMHG | DIASTOLIC BLOOD PRESSURE: 64 MMHG

## 2024-03-22 DIAGNOSIS — G43.109 MIGRAINE WITH AURA AND WITHOUT STATUS MIGRAINOSUS, NOT INTRACTABLE: ICD-10-CM

## 2024-03-22 DIAGNOSIS — G56.03 BILATERAL CARPAL TUNNEL SYNDROME: ICD-10-CM

## 2024-03-22 DIAGNOSIS — Z79.899 ENCOUNTER FOR LONG-TERM (CURRENT) USE OF MEDICATIONS: ICD-10-CM

## 2024-03-22 DIAGNOSIS — G43.719 INTRACTABLE CHRONIC MIGRAINE WITHOUT AURA AND WITHOUT STATUS MIGRAINOSUS: ICD-10-CM

## 2024-03-22 DIAGNOSIS — G40.419 OTHER GENERALIZED EPILEPSY, INTRACTABLE, WITHOUT STATUS EPILEPTICUS (HCC): Primary | ICD-10-CM

## 2024-03-22 DIAGNOSIS — R56.9 SEIZURES (HCC): ICD-10-CM

## 2024-03-22 DIAGNOSIS — G40.909 SEIZURE DISORDER (HCC): ICD-10-CM

## 2024-03-22 PROCEDURE — 3078F DIAST BP <80 MM HG: CPT | Performed by: PSYCHIATRY & NEUROLOGY

## 2024-03-22 PROCEDURE — 99214 OFFICE O/P EST MOD 30 MIN: CPT | Performed by: PSYCHIATRY & NEUROLOGY

## 2024-03-22 PROCEDURE — G8420 CALC BMI NORM PARAMETERS: HCPCS | Performed by: PSYCHIATRY & NEUROLOGY

## 2024-03-22 PROCEDURE — 1036F TOBACCO NON-USER: CPT | Performed by: PSYCHIATRY & NEUROLOGY

## 2024-03-22 PROCEDURE — G8427 DOCREV CUR MEDS BY ELIG CLIN: HCPCS | Performed by: PSYCHIATRY & NEUROLOGY

## 2024-03-22 PROCEDURE — 3074F SYST BP LT 130 MM HG: CPT | Performed by: PSYCHIATRY & NEUROLOGY

## 2024-03-22 PROCEDURE — 3017F COLORECTAL CA SCREEN DOC REV: CPT | Performed by: PSYCHIATRY & NEUROLOGY

## 2024-03-22 PROCEDURE — G8484 FLU IMMUNIZE NO ADMIN: HCPCS | Performed by: PSYCHIATRY & NEUROLOGY

## 2024-03-22 RX ORDER — SUMATRIPTAN 100 MG/1
TABLET, FILM COATED ORAL
Qty: 9 TABLET | Refills: 3 | Status: SHIPPED | OUTPATIENT
Start: 2024-03-22

## 2024-03-22 NOTE — PROGRESS NOTES
Subjective:      Patient ID: Seb Tilley is a 53 y.o. male who presents today for:  Chief Complaint   Patient presents with    6 Month Follow-Up     Pt states things are fine. Pt states he always have a migraines. Pt states he gets 1-2 a month for seizures. No questions or concerns at this time. Last dose of onfi was last night        HPI 53-year-old right-handed gentleman now with a known history of intractable epilepsy with bilateral representation and not a surgical candidate.  We tried him on multiple medications and he is settled down with clobazam 30 mg at night with lamotrigine 200 mg twice a day and Keppra 1500 mg twice a day.  He still breakthrough some seizures.  He has excessive rates down for other option.  He has migraines and still continues to have 1-2 seizures a month.  We had further discussed Xcopri and Epidiolex but he does not want to change his medications with the expectation of side effects.  He is aware of the medication availability.  For his migraines we have tried him on Aimovig as well as Qulipta and Ubrelvy none of this had helped  To seizures reported was 1 that he forgot his medications but nothing major happened and the second 1 was a brief seizures done much better with this combination of a higher dose of Onfi.    Patient saw migraine headaches are mostly left-sided mostly behind the left eye    No past medical history on file.  No past surgical history on file.  Social History     Socioeconomic History    Marital status:      Spouse name: Not on file    Number of children: Not on file    Years of education: Not on file    Highest education level: Not on file   Occupational History    Not on file   Tobacco Use    Smoking status: Never    Smokeless tobacco: Never   Substance and Sexual Activity    Alcohol use: Not on file    Drug use: Not on file    Sexual activity: Not on file   Other Topics Concern    Not on file   Social History Narrative    Not on file     Social

## 2024-03-26 LAB

## 2024-03-27 RX ORDER — LAMOTRIGINE 200 MG/1
200 TABLET ORAL 2 TIMES DAILY
Qty: 60 TABLET | Refills: 3 | Status: SHIPPED | OUTPATIENT
Start: 2024-03-27

## 2024-03-27 NOTE — TELEPHONE ENCOUNTER
Pharmacy is requesting medication refill. Please approve or deny this request.    Rx requested:  Requested Prescriptions     Pending Prescriptions Disp Refills    lamoTRIgine (LAMICTAL) 200 MG tablet [Pharmacy Med Name: lamotrigine 200 mg tablet] 60 tablet 3     Sig: TAKE 1 TABLET BY MOUTH TWICE DAILY         Last Office Visit:   3/22/2024      Next Visit Date:  Future Appointments   Date Time Provider Department Center   9/23/2024  3:30 PM Beto Berger MD [place] [facility] Neurology -

## 2024-06-10 RX ORDER — LEVETIRACETAM 500 MG/1
1500 TABLET ORAL 2 TIMES DAILY
Qty: 180 TABLET | Refills: 4 | Status: SHIPPED | OUTPATIENT
Start: 2024-06-10 | End: 2024-11-07

## 2024-06-10 NOTE — TELEPHONE ENCOUNTER
Pharmacy is requesting medication refill. Please approve or deny this request.    Rx requested:  Requested Prescriptions     Pending Prescriptions Disp Refills    levETIRAcetam (KEPPRA) 500 MG tablet [Pharmacy Med Name: levetiracetam 500 mg tablet] 180 tablet 4     Sig: Take 3 tablets by mouth 2 times daily         Last Office Visit:   3/22/2024      Next Visit Date:  Future Appointments   Date Time Provider Department Center   9/23/2024  3:30 PM Beto Berger MD [place] [facility] Neurology -

## 2024-06-19 RX ORDER — LEVETIRACETAM 500 MG/1
1500 TABLET ORAL 2 TIMES DAILY
Qty: 180 TABLET | Refills: 4 | Status: SHIPPED | OUTPATIENT
Start: 2024-06-19 | End: 2024-11-16

## 2024-06-19 NOTE — TELEPHONE ENCOUNTER
Patient is  requesting medication refill. Please approve or deny this request.    Rx requested:  Requested Prescriptions     Pending Prescriptions Disp Refills    levETIRAcetam (KEPPRA) 500 MG tablet 180 tablet 4     Sig: Take 3 tablets by mouth 2 times daily         Last Office Visit:   3/22/2024      Next Visit Date:  Future Appointments   Date Time Provider Department Center   9/23/2024  3:30 PM Beto Berger MD [place] [facility] Neurology -

## 2024-07-14 DIAGNOSIS — R56.9 SEIZURES (HCC): ICD-10-CM

## 2024-07-15 RX ORDER — CLOBAZAM 10 MG/1
30 TABLET ORAL NIGHTLY
Qty: 90 TABLET | Refills: 3 | Status: SHIPPED | OUTPATIENT
Start: 2024-07-15 | End: 2024-11-12

## 2024-07-25 RX ORDER — LAMOTRIGINE 200 MG/1
200 TABLET ORAL 2 TIMES DAILY
Qty: 60 TABLET | Refills: 3 | Status: SHIPPED | OUTPATIENT
Start: 2024-07-25

## 2024-07-25 NOTE — TELEPHONE ENCOUNTER
Pharmacy Is  requesting medication refill. Please approve or deny this request.    Rx requested:  Requested Prescriptions     Pending Prescriptions Disp Refills    lamoTRIgine (LAMICTAL) 200 MG tablet [Pharmacy Med Name: lamotrigine 200 mg tablet] 60 tablet 3     Sig: TAKE 1 TABLET BY MOUTH TWICE DAILY         Last Office Visit:   3/22/2024      Next Visit Date:  Future Appointments   Date Time Provider Department Center   9/23/2024  3:30 PM Beto Berger MD [place] [facility] Neurology -

## 2024-08-16 ENCOUNTER — APPOINTMENT (OUTPATIENT)
Dept: GENERAL RADIOLOGY | Age: 54
End: 2024-08-16
Payer: COMMERCIAL

## 2024-08-16 ENCOUNTER — APPOINTMENT (OUTPATIENT)
Dept: CT IMAGING | Age: 54
End: 2024-08-16
Payer: COMMERCIAL

## 2024-08-16 ENCOUNTER — HOSPITAL ENCOUNTER (EMERGENCY)
Age: 54
Discharge: HOME | End: 2024-08-16
Payer: COMMERCIAL

## 2024-08-16 VITALS
OXYGEN SATURATION: 96 % | WEIGHT: 165 LBS | SYSTOLIC BLOOD PRESSURE: 111 MMHG | HEIGHT: 71 IN | RESPIRATION RATE: 18 BRPM | HEART RATE: 76 BPM | BODY MASS INDEX: 23.1 KG/M2 | DIASTOLIC BLOOD PRESSURE: 83 MMHG | TEMPERATURE: 98.1 F

## 2024-08-16 DIAGNOSIS — R07.9 CHEST PAIN, UNSPECIFIED TYPE: Primary | ICD-10-CM

## 2024-08-16 LAB
ALBUMIN SERPL-MCNC: 5 G/DL (ref 3.5–4.6)
ALP SERPL-CCNC: 107 U/L (ref 35–104)
ALT SERPL-CCNC: 20 U/L (ref 0–41)
ANION GAP SERPL CALCULATED.3IONS-SCNC: 14 MEQ/L (ref 9–15)
AST SERPL-CCNC: 26 U/L (ref 0–40)
BASOPHILS # BLD: 0 K/UL (ref 0–0.2)
BASOPHILS NFR BLD: 0.5 %
BILIRUB SERPL-MCNC: 0.6 MG/DL (ref 0.2–0.7)
BUN SERPL-MCNC: 11 MG/DL (ref 6–20)
CALCIUM SERPL-MCNC: 10.4 MG/DL (ref 8.5–9.9)
CHLORIDE SERPL-SCNC: 102 MEQ/L (ref 95–107)
CO2 SERPL-SCNC: 25 MEQ/L (ref 20–31)
CREAT SERPL-MCNC: 0.97 MG/DL (ref 0.7–1.2)
EKG ATRIAL RATE: 79 BPM
EKG P AXIS: 28 DEGREES
EKG P-R INTERVAL: 144 MS
EKG Q-T INTERVAL: 368 MS
EKG QRS DURATION: 82 MS
EKG QTC CALCULATION (BAZETT): 421 MS
EKG R AXIS: 16 DEGREES
EKG T AXIS: 44 DEGREES
EKG VENTRICULAR RATE: 79 BPM
EOSINOPHIL # BLD: 0 K/UL (ref 0–0.7)
EOSINOPHIL NFR BLD: 2 %
ERYTHROCYTE [DISTWIDTH] IN BLOOD BY AUTOMATED COUNT: 12.1 % (ref 11.5–14.5)
ETHANOL PERCENT: NORMAL G/DL
ETHANOLAMINE SERPL-MCNC: <10 MG/DL (ref 0–0.08)
GLOBULIN SER CALC-MCNC: 3.5 G/DL (ref 2.3–3.5)
GLUCOSE SERPL-MCNC: 65 MG/DL (ref 70–99)
HCT VFR BLD AUTO: 52.3 % (ref 42–52)
HGB BLD-MCNC: 18.2 G/DL (ref 14–18)
LYMPHOCYTES # BLD: 3.7 K/UL (ref 1–4.8)
LYMPHOCYTES NFR BLD: 15 %
MCH RBC QN AUTO: 32.1 PG (ref 27–31.3)
MCHC RBC AUTO-ENTMCNC: 34.8 % (ref 33–37)
MCV RBC AUTO: 92.2 FL (ref 79–92.2)
MONOCYTES # BLD: 1 K/UL (ref 0.2–0.8)
MONOCYTES NFR BLD: 5.7 %
NEUTROPHILS # BLD: 11.3 K/UL (ref 1.4–6.5)
NEUTS SEG NFR BLD: 71 %
PLATELET # BLD AUTO: 335 K/UL (ref 130–400)
PLATELET BLD QL SMEAR: ADEQUATE
POTASSIUM SERPL-SCNC: 3.9 MEQ/L (ref 3.4–4.9)
PROT SERPL-MCNC: 8.5 G/DL (ref 6.3–8)
RBC # BLD AUTO: 5.67 M/UL (ref 4.7–6.1)
SLIDE REVIEW: ABNORMAL
SMUDGE CELLS BLD QL SMEAR: 6.7
SODIUM SERPL-SCNC: 141 MEQ/L (ref 135–144)
TROPONIN, HIGH SENSITIVITY: 7 NG/L (ref 0–19)
TROPONIN, HIGH SENSITIVITY: <6 NG/L (ref 0–19)
VARIANT LYMPHS NFR BLD: 8 %
WBC # BLD AUTO: 15.9 K/UL (ref 4.8–10.8)

## 2024-08-16 PROCEDURE — 71275 CT ANGIOGRAPHY CHEST: CPT

## 2024-08-16 PROCEDURE — 82077 ASSAY SPEC XCP UR&BREATH IA: CPT

## 2024-08-16 PROCEDURE — 84484 ASSAY OF TROPONIN QUANT: CPT

## 2024-08-16 PROCEDURE — 6370000000 HC RX 637 (ALT 250 FOR IP): Performed by: PHYSICIAN ASSISTANT

## 2024-08-16 PROCEDURE — 71045 X-RAY EXAM CHEST 1 VIEW: CPT

## 2024-08-16 PROCEDURE — 93005 ELECTROCARDIOGRAM TRACING: CPT | Performed by: PHYSICIAN ASSISTANT

## 2024-08-16 PROCEDURE — 85025 COMPLETE CBC W/AUTO DIFF WBC: CPT

## 2024-08-16 PROCEDURE — 99285 EMERGENCY DEPT VISIT HI MDM: CPT

## 2024-08-16 PROCEDURE — 80053 COMPREHEN METABOLIC PANEL: CPT

## 2024-08-16 PROCEDURE — 6360000004 HC RX CONTRAST MEDICATION: Performed by: PHYSICIAN ASSISTANT

## 2024-08-16 RX ORDER — ACETAMINOPHEN 500 MG
1000 TABLET ORAL ONCE
Status: COMPLETED | OUTPATIENT
Start: 2024-08-16 | End: 2024-08-16

## 2024-08-16 RX ORDER — ESOMEPRAZOLE MAGNESIUM 40 MG/1
40 CAPSULE, DELAYED RELEASE ORAL
Qty: 30 CAPSULE | Refills: 0 | Status: SHIPPED | OUTPATIENT
Start: 2024-08-16

## 2024-08-16 RX ORDER — ASPIRIN 81 MG/1
324 TABLET, CHEWABLE ORAL ONCE
Status: COMPLETED | OUTPATIENT
Start: 2024-08-16 | End: 2024-08-16

## 2024-08-16 RX ORDER — NITROGLYCERIN 0.4 MG/1
0.4 TABLET SUBLINGUAL EVERY 5 MIN PRN
Status: DISCONTINUED | OUTPATIENT
Start: 2024-08-16 | End: 2024-08-16 | Stop reason: HOSPADM

## 2024-08-16 RX ADMIN — NITROGLYCERIN 0.4 MG: 0.4 TABLET, ORALLY DISINTEGRATING SUBLINGUAL at 13:42

## 2024-08-16 RX ADMIN — ASPIRIN 324 MG: 81 TABLET, CHEWABLE ORAL at 13:26

## 2024-08-16 RX ADMIN — NITROGLYCERIN 0.4 MG: 0.4 TABLET, ORALLY DISINTEGRATING SUBLINGUAL at 13:37

## 2024-08-16 RX ADMIN — ACETAMINOPHEN 1000 MG: 500 TABLET ORAL at 13:39

## 2024-08-16 RX ADMIN — NITROGLYCERIN 0.4 MG: 0.4 TABLET, ORALLY DISINTEGRATING SUBLINGUAL at 13:26

## 2024-08-16 RX ADMIN — IOPAMIDOL 75 ML: 755 INJECTION, SOLUTION INTRAVENOUS at 14:12

## 2024-08-16 ASSESSMENT — ENCOUNTER SYMPTOMS
SHORTNESS OF BREATH: 0
EYE DISCHARGE: 0
COLOR CHANGE: 0
ABDOMINAL DISTENTION: 0
RHINORRHEA: 0
CONSTIPATION: 0
SORE THROAT: 0
ABDOMINAL PAIN: 0

## 2024-08-16 ASSESSMENT — PAIN DESCRIPTION - PAIN TYPE: TYPE: ACUTE PAIN

## 2024-08-16 ASSESSMENT — PAIN DESCRIPTION - ORIENTATION: ORIENTATION: MID

## 2024-08-16 ASSESSMENT — PAIN - FUNCTIONAL ASSESSMENT
PAIN_FUNCTIONAL_ASSESSMENT: 0-10
PAIN_FUNCTIONAL_ASSESSMENT: 0-10

## 2024-08-16 ASSESSMENT — PAIN DESCRIPTION - DESCRIPTORS: DESCRIPTORS: SHARP;STABBING

## 2024-08-16 ASSESSMENT — PAIN DESCRIPTION - LOCATION: LOCATION: BACK;CHEST

## 2024-08-16 ASSESSMENT — PAIN SCALES - GENERAL
PAINLEVEL_OUTOF10: 8
PAINLEVEL_OUTOF10: 3

## 2024-08-16 ASSESSMENT — LIFESTYLE VARIABLES
HOW MANY STANDARD DRINKS CONTAINING ALCOHOL DO YOU HAVE ON A TYPICAL DAY: PATIENT DOES NOT DRINK
HOW OFTEN DO YOU HAVE A DRINK CONTAINING ALCOHOL: NEVER

## 2024-08-16 NOTE — ED PROVIDER NOTES
[facility] ED  EMERGENCY DEPARTMENT ENCOUNTER      Pt Name: Seb Tilley  MRN: [id number]  Birthdate 1970  Date of evaluation: 8/16/2024  Provider: Shane Joseph PA-C  1:13 PM EDT    CHIEF COMPLAINT       Chief Complaint   Patient presents with    Chest Pain     Patient CO midsternal CP since last PM. Denies cardiac/pulmonary hx.         HISTORY OF PRESENT ILLNESS   (Location/Symptom, Timing/Onset, Context/Setting, Quality, Duration, Modifying Factors, Severity)  Note limiting factors.   Seb Tilley is a 53 y.o. male who presents to the emergency department with complaint of midsternal chest pain which patient states started yesterday around 9 AM for him in the morning, has been persistent since that time, patient states pain started out as what he describes as heartburn, he said it migrated up into the left upper chest, and into the back between his scapula, has been persistent all day, no nausea vomiting, no shortness of breath, no diaphoresis, patient denies any cardiac history, rates his current pain is a 7 out of 10 at this time, patient transported ED by EMS, he did not receive any aspirin and nitroglycerin during transport.  Patient has not taken any medications at home for chest pain.  Past medical history per patient seizure disorder migraine headaches asthma.     HPI    Nursing Notes were reviewed.    REVIEW OF SYSTEMS    (2-9 systems for level 4, 10 or more for level 5)     Review of Systems   Constitutional:  Negative for activity change and appetite change.   HENT:  Negative for congestion, ear discharge, ear pain, nosebleeds, rhinorrhea and sore throat.    Eyes:  Negative for discharge.   Respiratory:  Negative for shortness of breath.    Cardiovascular:  Positive for chest pain. Negative for palpitations and leg swelling.   Gastrointestinal:  Negative for abdominal distention, abdominal pain and constipation.   Genitourinary:  Negative for difficulty urinating and dysuria.  ill-appearing, toxic-appearing or diaphoretic.   HENT:      Head: Normocephalic.      Right Ear: Tympanic membrane normal.      Left Ear: Tympanic membrane normal.      Nose: No congestion.      Mouth/Throat:      Mouth: Mucous membranes are moist.      Pharynx: No oropharyngeal exudate or posterior oropharyngeal erythema.   Eyes:      Extraocular Movements: Extraocular movements intact.      Conjunctiva/sclera: Conjunctivae normal.      Pupils: Pupils are equal, round, and reactive to light.   Neck:      Vascular: No JVD.      Trachea: No tracheal deviation.   Cardiovascular:      Rate and Rhythm: Normal rate.      Pulses: Normal pulses.      Heart sounds: Normal heart sounds. No murmur heard.     No friction rub. No gallop.   Pulmonary:      Effort: Pulmonary effort is normal. No tachypnea, accessory muscle usage, respiratory distress or retractions.      Breath sounds: Normal breath sounds. No stridor. No wheezing, rhonchi or rales.      Comments: Lung sounds are clear in all fields, there is no wheezes rales or rhonchi, no accessory muscle use, no tractions, patient speaks in full sentences without acute distress, no increased pain with deep inspiration or by palpation.  Chest:      Chest wall: No tenderness.   Abdominal:      General: Abdomen is flat. Bowel sounds are normal. There is no distension or abdominal bruit.      Palpations: There is no shifting dullness, fluid wave, hepatomegaly, splenomegaly, mass or pulsatile mass.      Tenderness: There is no abdominal tenderness. There is no right CVA tenderness, left CVA tenderness, guarding or rebound. Negative signs include Wade's sign, Rovsing's sign and McBurney's sign.      Comments: Abdomen soft, nondistended, nontender, no guarding mass rebound, no CVA tenderness.   Musculoskeletal:         General: No deformity.      Cervical back: Normal range of motion and neck supple. No rigidity.   Skin:     General: Skin is warm and dry.      Capillary Refill:

## 2024-08-18 LAB
PERFORMED ON: NORMAL
POC CREATININE: 1 MG/DL (ref 0.8–1.3)
POC SAMPLE TYPE: NORMAL

## 2024-08-19 ENCOUNTER — TELEPHONE (OUTPATIENT)
Dept: CARDIOLOGY CLINIC | Age: 54
End: 2024-08-19

## 2024-08-19 LAB
EKG ATRIAL RATE: 79 BPM
EKG P AXIS: 28 DEGREES
EKG P-R INTERVAL: 144 MS
EKG Q-T INTERVAL: 368 MS
EKG QRS DURATION: 82 MS
EKG QTC CALCULATION (BAZETT): 421 MS
EKG R AXIS: 16 DEGREES
EKG T AXIS: 44 DEGREES
EKG VENTRICULAR RATE: 79 BPM

## 2024-08-19 PROCEDURE — 93010 ELECTROCARDIOGRAM REPORT: CPT | Performed by: INTERNAL MEDICINE

## 2024-08-19 NOTE — TELEPHONE ENCOUNTER
Called patient to schedule an appointment with a provider in the office to follow up after his ER visit. Per patient, he would like to follow up with his PCP before making an appointment. Patient will call back if his PCP recommends follow up with cardiology.    ----- Message from Dr. Rainer Gong MD sent at 8/19/2024  8:30 AM EDT -----  Need OV for CP  ----- Message -----  From: Discharge Provider, Automatic  Sent: 8/17/2024   4:43 AM EDT  To: Rainer LOTT MD

## 2024-09-23 ENCOUNTER — OFFICE VISIT (OUTPATIENT)
Dept: NEUROLOGY | Age: 54
End: 2024-09-23
Payer: COMMERCIAL

## 2024-09-23 VITALS
SYSTOLIC BLOOD PRESSURE: 110 MMHG | HEART RATE: 76 BPM | BODY MASS INDEX: 21.48 KG/M2 | DIASTOLIC BLOOD PRESSURE: 70 MMHG | WEIGHT: 154 LBS

## 2024-09-23 DIAGNOSIS — Z79.899 ENCOUNTER FOR LONG-TERM (CURRENT) USE OF MEDICATIONS: ICD-10-CM

## 2024-09-23 DIAGNOSIS — G43.109 MIGRAINE WITH AURA AND WITHOUT STATUS MIGRAINOSUS, NOT INTRACTABLE: ICD-10-CM

## 2024-09-23 DIAGNOSIS — G40.419 OTHER GENERALIZED EPILEPSY, INTRACTABLE, WITHOUT STATUS EPILEPTICUS (HCC): Primary | ICD-10-CM

## 2024-09-23 DIAGNOSIS — G40.419 OTHER GENERALIZED EPILEPSY, INTRACTABLE, WITHOUT STATUS EPILEPTICUS (HCC): ICD-10-CM

## 2024-09-23 LAB
ALBUMIN SERPL-MCNC: 4.7 G/DL (ref 3.5–4.6)
ALP SERPL-CCNC: 83 U/L (ref 35–104)
ALT SERPL-CCNC: 15 U/L (ref 0–41)
ANION GAP SERPL CALCULATED.3IONS-SCNC: 11 MEQ/L (ref 9–15)
AST SERPL-CCNC: 18 U/L (ref 0–40)
BASOPHILS # BLD: 0.1 K/UL (ref 0–0.2)
BASOPHILS NFR BLD: 0.8 %
BILIRUB SERPL-MCNC: 0.3 MG/DL (ref 0.2–0.7)
BUN SERPL-MCNC: 12 MG/DL (ref 6–20)
CALCIUM SERPL-MCNC: 9.4 MG/DL (ref 8.5–9.9)
CHLORIDE SERPL-SCNC: 104 MEQ/L (ref 95–107)
CO2 SERPL-SCNC: 28 MEQ/L (ref 20–31)
CREAT SERPL-MCNC: 1.07 MG/DL (ref 0.7–1.2)
EOSINOPHIL # BLD: 0.3 K/UL (ref 0–0.7)
EOSINOPHIL NFR BLD: 2.7 %
ERYTHROCYTE [DISTWIDTH] IN BLOOD BY AUTOMATED COUNT: 12.3 % (ref 11.5–14.5)
GLOBULIN SER CALC-MCNC: 2.9 G/DL (ref 2.3–3.5)
GLUCOSE SERPL-MCNC: 79 MG/DL (ref 70–99)
HCT VFR BLD AUTO: 51.6 % (ref 42–52)
HGB BLD-MCNC: 17.4 G/DL (ref 14–18)
LYMPHOCYTES # BLD: 3.7 K/UL (ref 1–4.8)
LYMPHOCYTES NFR BLD: 35.5 %
MCH RBC QN AUTO: 32 PG (ref 27–31.3)
MCHC RBC AUTO-ENTMCNC: 33.7 % (ref 33–37)
MCV RBC AUTO: 95 FL (ref 79–92.2)
MONOCYTES # BLD: 0.8 K/UL (ref 0.2–0.8)
MONOCYTES NFR BLD: 7.6 %
NEUTROPHILS # BLD: 5.5 K/UL (ref 1.4–6.5)
NEUTS SEG NFR BLD: 52.7 %
PLATELET # BLD AUTO: 293 K/UL (ref 130–400)
POTASSIUM SERPL-SCNC: 4.3 MEQ/L (ref 3.4–4.9)
PROT SERPL-MCNC: 7.6 G/DL (ref 6.3–8)
RBC # BLD AUTO: 5.43 M/UL (ref 4.7–6.1)
SODIUM SERPL-SCNC: 143 MEQ/L (ref 135–144)
TSH REFLEX: 1.69 UIU/ML (ref 0.44–3.86)
WBC # BLD AUTO: 10.4 K/UL (ref 4.8–10.8)

## 2024-09-23 PROCEDURE — G8427 DOCREV CUR MEDS BY ELIG CLIN: HCPCS | Performed by: PSYCHIATRY & NEUROLOGY

## 2024-09-23 PROCEDURE — 3017F COLORECTAL CA SCREEN DOC REV: CPT | Performed by: PSYCHIATRY & NEUROLOGY

## 2024-09-23 PROCEDURE — G8420 CALC BMI NORM PARAMETERS: HCPCS | Performed by: PSYCHIATRY & NEUROLOGY

## 2024-09-23 PROCEDURE — 99214 OFFICE O/P EST MOD 30 MIN: CPT | Performed by: PSYCHIATRY & NEUROLOGY

## 2024-09-23 PROCEDURE — 3078F DIAST BP <80 MM HG: CPT | Performed by: PSYCHIATRY & NEUROLOGY

## 2024-09-23 PROCEDURE — 3074F SYST BP LT 130 MM HG: CPT | Performed by: PSYCHIATRY & NEUROLOGY

## 2024-09-23 PROCEDURE — 1036F TOBACCO NON-USER: CPT | Performed by: PSYCHIATRY & NEUROLOGY

## 2024-09-23 NOTE — PROGRESS NOTES
Ubrelvy we had him on Imitrex Nurtec none of this helped.  There is not much we can add at this point    He is more concerned about his weight loss which she had 11 pounds since last seen here.  Recommended that he touch base with his primary care as well and will obtain some labs today.  None of the seizure medications would do that    Beto Berger MD, MARIL[place]  Diplomate, American Board of Psychiatry & Neurology  Board Certified in Vascular Neurology  Board Certified in Neuromuscular Medicine  Certified in Neurorehabilitation           Plan:      Orders Placed This Encounter   Procedures    TSH with Reflex     Standing Status:   Future     Standing Expiration Date:   9/23/2025    CBC with Auto Differential     Standing Status:   Future     Standing Expiration Date:   9/23/2025    Comprehensive Metabolic Panel     Standing Status:   Future     Standing Expiration Date:   9/23/2025     No orders of the defined types were placed in this encounter.      No follow-ups on file.      Beto Berger MD

## 2024-09-24 LAB — LAMOTRIGINE SERPL-MCNC: 15.2 UG/ML (ref 3–15)

## 2024-11-06 RX ORDER — LEVETIRACETAM 500 MG/1
TABLET ORAL
Qty: 180 TABLET | Refills: 4 | Status: SHIPPED | OUTPATIENT
Start: 2024-11-06

## 2024-11-06 NOTE — TELEPHONE ENCOUNTER
Requesting medication refill. Please approve or deny this request.    Rx requested:  Requested Prescriptions     Pending Prescriptions Disp Refills    levETIRAcetam (KEPPRA) 500 MG tablet [Pharmacy Med Name: levetiracetam 500 mg tablet] 180 tablet 4     Sig: TAKE 3 TABLETS BY MOUTH TWICE DAILY         Last Office Visit:   9/23/2024      Next Visit Date:  Future Appointments   Date Time Provider Department Center   3/24/2025  3:30 PM Beto Berger MD [place] [facility] Neurology -               Last refill 6/19/24. Please approve or deny.

## 2024-11-13 DIAGNOSIS — R56.9 SEIZURES (HCC): ICD-10-CM

## 2024-11-13 RX ORDER — CLOBAZAM 10 MG/1
30 TABLET ORAL NIGHTLY
Qty: 90 TABLET | Refills: 3 | Status: SHIPPED | OUTPATIENT
Start: 2024-11-13 | End: 2025-03-13

## 2024-11-13 NOTE — TELEPHONE ENCOUNTER
Pharmacy is  requesting medication refill. Please approve or deny this request.    Rx requested:  Requested Prescriptions     Pending Prescriptions Disp Refills    cloBAZam (ONFI) 10 MG TABS tablet [Pharmacy Med Name: clobazam 10 mg tablet] 90 tablet 3     Sig: Take 3 tablets by mouth nightly for 120 days. Max Daily Amount: 30 mg         Last Office Visit:   9/23/2024      Next Visit Date:  Future Appointments   Date Time Provider Department Center   3/24/2025  3:30 PM Beto Berger MD [place] [facility] -

## 2024-11-19 RX ORDER — LAMOTRIGINE 200 MG/1
200 TABLET ORAL 2 TIMES DAILY
Qty: 60 TABLET | Refills: 3 | Status: SHIPPED | OUTPATIENT
Start: 2024-11-19

## 2024-11-19 NOTE — TELEPHONE ENCOUNTER
Requesting medication refill. Please approve or deny this request.    Rx requested:  Requested Prescriptions     Pending Prescriptions Disp Refills    lamoTRIgine (LAMICTAL) 200 MG tablet [Pharmacy Med Name: lamotrigine 200 mg tablet] 60 tablet 3     Sig: TAKE 1 TABLET BY MOUTH TWICE DAILY         Last Office Visit:   9/23/2024      Next Visit Date:  Future Appointments   Date Time Provider Department Center   3/24/2025  3:30 PM Beto Berger MD [place] [facility] -               Last refill 7/25/24. Please approve or deny.

## 2024-11-21 ENCOUNTER — TELEPHONE (OUTPATIENT)
Dept: NEUROLOGY | Age: 54
End: 2024-11-21

## 2024-11-21 NOTE — TELEPHONE ENCOUNTER
PATIENT is requesting a sooner appointment then 3/24/2025 because his memory is getting worse and he is losing a bunch of weight without trying, he has lost 10 lbs in the last 10 months

## 2024-12-24 ENCOUNTER — HOSPITAL ENCOUNTER (EMERGENCY)
Age: 54
Discharge: HOME | End: 2024-12-24
Payer: MEDICARE

## 2024-12-24 VITALS
BODY MASS INDEX: 21.7 KG/M2 | OXYGEN SATURATION: 98 % | HEIGHT: 71 IN | TEMPERATURE: 98.8 F | SYSTOLIC BLOOD PRESSURE: 145 MMHG | WEIGHT: 155 LBS | HEART RATE: 92 BPM | RESPIRATION RATE: 16 BRPM | DIASTOLIC BLOOD PRESSURE: 83 MMHG

## 2024-12-24 DIAGNOSIS — R56.9 SEIZURES (HCC): ICD-10-CM

## 2024-12-24 DIAGNOSIS — Z76.0 ENCOUNTER FOR MEDICATION REFILL: Primary | ICD-10-CM

## 2024-12-24 PROCEDURE — 99283 EMERGENCY DEPT VISIT LOW MDM: CPT

## 2024-12-24 RX ORDER — CLOBAZAM 10 MG/1
10 TABLET ORAL DAILY
Qty: 9 TABLET | Refills: 0 | Status: SHIPPED | OUTPATIENT
Start: 2024-12-24 | End: 2025-01-02

## 2024-12-24 ASSESSMENT — PAIN - FUNCTIONAL ASSESSMENT: PAIN_FUNCTIONAL_ASSESSMENT: NONE - DENIES PAIN

## 2024-12-24 ASSESSMENT — ENCOUNTER SYMPTOMS
EYES NEGATIVE: 1
SHORTNESS OF BREATH: 0
ABDOMINAL PAIN: 0
RESPIRATORY NEGATIVE: 1
ALLERGIC/IMMUNOLOGIC NEGATIVE: 1
GASTROINTESTINAL NEGATIVE: 1

## 2024-12-24 NOTE — ED PROVIDER NOTES
[facility] ED  eMERGENCY dEPARTMENT eNCOUnter      Pt Name: Seb Tilley  MRN: [id number]  Birthdate 1970  Date of evaluation: 12/24/2024  Provider: NICA Varela CNP      HISTORY OF PRESENT ILLNESS    Seb Tilley is a 54 y.o. male who presents to the Emergency Department with past medical history of seizures.  Patient presents today for a medication refill of 10mg Clobazam.  Patient was supposed to  this prescription from the pharmacy today but they closed early due to [facility] hours.  Patient denies all complaints.  Denies seizure activity.  Denies aura.  There are no focal neurological deficits noted.  Patient denies chest pain shortness of breath abdominal pain nausea vomiting chills headaches lightheadedness dizziness.  Patient is hemodynamically stable nontoxic-appearing.        REVIEW OF SYSTEMS       Review of Systems   Constitutional: Negative.  Negative for activity change, chills and fever.   HENT: Negative.     Eyes: Negative.    Respiratory: Negative.  Negative for shortness of breath.    Cardiovascular: Negative.  Negative for chest pain.   Gastrointestinal: Negative.  Negative for abdominal pain.   Endocrine: Negative.    Genitourinary: Negative.    Musculoskeletal: Negative.    Skin: Negative.    Allergic/Immunologic: Negative.    Neurological: Negative.  Negative for dizziness, seizures, weakness, light-headedness, numbness and headaches.   Psychiatric/Behavioral: Negative.           PAST MEDICAL HISTORY   History reviewed. No pertinent past medical history.      SURGICAL HISTORY     History reviewed. No pertinent surgical history.      CURRENT MEDICATIONS       Discharge Medication List as of 12/24/2024  3:30 PM        CONTINUE these medications which have NOT CHANGED    Details   lamoTRIgine (LAMICTAL) 200 MG tablet TAKE 1 TABLET BY MOUTH TWICE DAILY, Disp-60 tablet, R-3This prescription was filled on 10/30/2024. Any refills authorized will be placed on

## 2024-12-24 NOTE — ED TRIAGE NOTES
Pt in need of medication refill, Clobazam 10 mg TID. pt states his pharmacy closed earlier than they anticipated. Pt is A&OX4, skin intact, afebrile, breaths are equal and unlabored.

## 2025-03-19 DIAGNOSIS — R56.9 SEIZURES (HCC): ICD-10-CM

## 2025-03-19 NOTE — TELEPHONE ENCOUNTER
requesting medication refill. Please approve or deny this request.    Rx requested:  Requested Prescriptions     Pending Prescriptions Disp Refills    cloBAZam (ONFI) 10 MG TABS tablet 90 tablet 3     Sig: Take 3 tablets by mouth nightly for 120 days. Max Daily Amount: 30 mg         Last Office Visit:   9/23/2024      Next Visit Date:  Future Appointments   Date Time Provider Department Center   3/24/2025  3:30 PM [facility] -

## 2025-03-20 RX ORDER — CLOBAZAM 10 MG/1
30 TABLET ORAL NIGHTLY
Qty: 90 TABLET | Refills: 3 | Status: SHIPPED | OUTPATIENT
Start: 2025-03-20 | End: 2025-07-18

## 2025-03-21 ENCOUNTER — TELEPHONE (OUTPATIENT)
Dept: NEUROLOGY | Age: 55
End: 2025-03-21

## 2025-03-21 NOTE — TELEPHONE ENCOUNTER
Pt called about his medication refill that [facility] filled. The pharmacy states that he is on Day 24 of 30 from his last prescritption of cloBAZam. Pharmacist stated that the doctor would have to call in himself so give authorization to pharmacy to release the medication to the patient.   Pt states he is leaving on a vacation tomorrow.

## 2025-03-31 RX ORDER — LAMOTRIGINE 200 MG/1
200 TABLET ORAL 2 TIMES DAILY
Qty: 60 TABLET | Refills: 3 | Status: SHIPPED | OUTPATIENT
Start: 2025-03-31

## 2025-03-31 NOTE — TELEPHONE ENCOUNTER
Patient requesting medication refill. Please approve or deny this request.    Rx requested:  Requested Prescriptions     Pending Prescriptions Disp Refills    lamoTRIgine (LAMICTAL) 200 MG tablet 60 tablet 3     Sig: Take 1 tablet by mouth 2 times daily         Last Office Visit:   9/23/2024      Next Visit Date:  Future Appointments   Date Time Provider Department Center   7/21/2025  3:15 PM Beto Berger MD [place] [facility] -

## 2025-05-15 RX ORDER — LEVETIRACETAM 500 MG/1
500 TABLET ORAL 2 TIMES DAILY
Qty: 60 TABLET | Refills: 4 | Status: SHIPPED | OUTPATIENT
Start: 2025-05-15 | End: 2025-10-12

## 2025-05-15 NOTE — TELEPHONE ENCOUNTER
Patient is  requesting medication refill. Please approve or deny this request.    Rx requested:  Requested Prescriptions     Pending Prescriptions Disp Refills    levETIRAcetam (KEPPRA) 500 MG tablet 60 tablet 4     Sig: Take 1 tablet by mouth 2 times daily         Last Office Visit:   9/23/2024      Next Visit Date:  Future Appointments   Date Time Provider Department Center   7/21/2025  3:15 PM Beto Berger MD [place] [facility] -

## 2025-05-19 RX ORDER — LEVETIRACETAM 500 MG/1
1500 TABLET ORAL 2 TIMES DAILY
Qty: 120 TABLET | Refills: 0 | Status: SHIPPED | OUTPATIENT
Start: 2025-05-19

## 2025-05-19 NOTE — TELEPHONE ENCOUNTER
Seb called in he needs the remainder of Prescription sent to pharmacy only 60 were sent not 180     requesting medication refill. Please approve or deny this request.    Rx requested:  Requested Prescriptions     Pending Prescriptions Disp Refills    levETIRAcetam (KEPPRA) 500 MG tablet 120 tablet 0     Sig: Take 3 tablets by mouth 2 times daily         Last Office Visit:   9/23/2024      Next Visit Date:  Future Appointments   Date Time Provider Department Center   7/21/2025  3:15 PM Beto Berger MD [place] [facility] Neu[facility] -

## 2025-06-20 RX ORDER — LEVETIRACETAM 500 MG/1
1500 TABLET ORAL 2 TIMES DAILY
Qty: 120 TABLET | Refills: 0 | Status: SHIPPED | OUTPATIENT
Start: 2025-06-20

## 2025-06-24 RX ORDER — LEVETIRACETAM 500 MG/1
1500 TABLET ORAL 2 TIMES DAILY
Qty: 60 TABLET | Refills: 0 | Status: SHIPPED | OUTPATIENT
Start: 2025-06-24

## 2025-06-24 NOTE — TELEPHONE ENCOUNTER
Rx sent on Friday was short 60 tablets, should have been 180 tablets and not 120. Pt requesting the remanding 60 be sent to his pharmacy. Please advise.

## 2025-07-21 ENCOUNTER — OFFICE VISIT (OUTPATIENT)
Age: 55
End: 2025-07-21

## 2025-07-21 VITALS
BODY MASS INDEX: 23.01 KG/M2 | DIASTOLIC BLOOD PRESSURE: 89 MMHG | SYSTOLIC BLOOD PRESSURE: 137 MMHG | HEART RATE: 67 BPM | WEIGHT: 165 LBS

## 2025-07-21 DIAGNOSIS — Z79.899 ENCOUNTER FOR LONG-TERM (CURRENT) USE OF MEDICATIONS: Primary | ICD-10-CM

## 2025-07-21 DIAGNOSIS — R56.9 SEIZURES (HCC): ICD-10-CM

## 2025-07-21 DIAGNOSIS — Z79.899 ENCOUNTER FOR LONG-TERM (CURRENT) USE OF MEDICATIONS: ICD-10-CM

## 2025-07-21 DIAGNOSIS — G43.109 MIGRAINE WITH AURA AND WITHOUT STATUS MIGRAINOSUS, NOT INTRACTABLE: ICD-10-CM

## 2025-07-21 LAB
ALBUMIN SERPL-MCNC: 4.6 G/DL (ref 3.5–4.6)
ALP SERPL-CCNC: 77 U/L (ref 35–104)
ALT SERPL-CCNC: 15 U/L (ref 0–41)
AST SERPL-CCNC: 18 U/L (ref 0–40)
BASOPHILS # BLD: 0.1 K/UL (ref 0–0.2)
BASOPHILS NFR BLD: 0.8 %
BILIRUB DIRECT SERPL-MCNC: <0.1 MG/DL (ref 0–0.4)
BILIRUB INDIRECT SERPL-MCNC: 0.2 MG/DL (ref 0–0.6)
BILIRUB SERPL-MCNC: 0.3 MG/DL (ref 0.2–0.7)
EOSINOPHIL # BLD: 0.3 K/UL (ref 0–0.7)
EOSINOPHIL NFR BLD: 3.1 %
ERYTHROCYTE [DISTWIDTH] IN BLOOD BY AUTOMATED COUNT: 11.9 % (ref 11.5–14.5)
HCT VFR BLD AUTO: 50 % (ref 42–52)
HGB BLD-MCNC: 17.1 G/DL (ref 14–18)
LYMPHOCYTES # BLD: 3.8 K/UL (ref 1–4.8)
LYMPHOCYTES NFR BLD: 36 %
MCH RBC QN AUTO: 32.3 PG (ref 27–31.3)
MCHC RBC AUTO-ENTMCNC: 34.2 % (ref 33–37)
MCV RBC AUTO: 94.5 FL (ref 79–92.2)
MONOCYTES # BLD: 0.9 K/UL (ref 0.2–0.8)
MONOCYTES NFR BLD: 8.2 %
NEUTROPHILS # BLD: 5.4 K/UL (ref 1.4–6.5)
NEUTS SEG NFR BLD: 51.3 %
PLATELET # BLD AUTO: 313 K/UL (ref 130–400)
PROT SERPL-MCNC: 7.6 G/DL (ref 6.3–8)
RBC # BLD AUTO: 5.29 M/UL (ref 4.7–6.1)
WBC # BLD AUTO: 10.6 K/UL (ref 4.8–10.8)

## 2025-07-21 RX ORDER — CLOBAZAM 10 MG/1
30 TABLET ORAL NIGHTLY
Qty: 90 TABLET | Refills: 3 | Status: SHIPPED | OUTPATIENT
Start: 2025-07-21 | End: 2025-11-18

## 2025-07-21 RX ORDER — LAMOTRIGINE 200 MG/1
200 TABLET ORAL 2 TIMES DAILY
Qty: 60 TABLET | Refills: 3 | Status: SHIPPED | OUTPATIENT
Start: 2025-07-21

## 2025-07-21 RX ORDER — LEVETIRACETAM 500 MG/1
1500 TABLET ORAL 2 TIMES DAILY
Qty: 60 TABLET | Refills: 0 | Status: SHIPPED | OUTPATIENT
Start: 2025-07-21 | End: 2025-07-22 | Stop reason: SDUPTHER

## 2025-07-21 ASSESSMENT — ENCOUNTER SYMPTOMS
VOMITING: 0
TROUBLE SWALLOWING: 0
SHORTNESS OF BREATH: 0
PHOTOPHOBIA: 0
NAUSEA: 0
BACK PAIN: 0
CHOKING: 0
COLOR CHANGE: 0

## 2025-07-21 NOTE — PROGRESS NOTES
results found for: \"LITHIUM\", \"DILFRTOT\", \"VALPROATE\"    Assessment:       Diagnosis Orders   1. Encounter for long-term (current) use of medications  Pain Management Drug Screen      2. Seizures (HCC)  cloBAZam (ONFI) 10 MG TABS tablet    Hepatic Function Panel    CBC with Auto Differential      3. 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  Patient still breakthrough 1 seizure.  Patient has been tried on multiple medication and now settled down with Onfi 30 mg at night with Keppra 1500 mg twice a day and Lamictal 200 mg a day.  Patient has tried other medication which caused dizziness.  We had recommended Xcopri which she did not want to try as he expected that would cause dizziness.    Patient has intractable migraines which are now becoming frequent and therefore he is not taking the Aimovig.  He actually did not respond to some of them either.  Will keep an eye on this and obtain levels today and follow-up.  Patient is on Onfi which actually is not part of the urine tox screen    Beto Berger MD, MARIO  Diplomate, American Board of Psychiatry & Neurology  Board Certified in Vascular Neurology  Board Certified in Neuromuscular Medicine  Certified in Neurorehabilitation         Plan:      Orders Placed This Encounter   Procedures    Pain Management Drug Screen     Standing Status:   Future     Expected Date:   7/21/2025     Expiration Date:   7/21/2026    Hepatic Function Panel     Standing Status:   Future     Expected Date:   7/21/2025     Expiration Date:   7/21/2026    CBC with Auto Differential     Standing Status:   Future     Expected Date:   7/21/2025     Expiration Date:   7/21/2026     Orders Placed This Encounter   Medications    cloBAZam (ONFI) 10 MG TABS tablet     Sig: Take 3 tablets by mouth nightly for 120 days. Max Daily Amount: 30 mg     Dispense:  90 tablet     Refill:

## 2025-07-22 RX ORDER — LEVETIRACETAM 500 MG/1
1500 TABLET ORAL 2 TIMES DAILY
Qty: 180 TABLET | Refills: 5 | Status: SHIPPED | OUTPATIENT
Start: 2025-07-22

## 2025-07-22 NOTE — TELEPHONE ENCOUNTER
Rx sent yesterday was only for 10day supply     Requesting medication refill. Please approve or deny this request.    Rx requested:  Requested Prescriptions     Pending Prescriptions Disp Refills    levETIRAcetam (KEPPRA) 500 MG tablet 180 tablet 5     Sig: Take 3 tablets by mouth 2 times daily         Last Office Visit:   7/21/2025      Next Visit Date:  Future Appointments   Date Time Provider Department Center   1/19/2026  3:30 PM Beto Berger MD [place] [facility] Neurology -

## 2025-07-25 LAB
6MAM UR QL: NOT DETECTED
7-AMINOCLONAZEPAM: NOT DETECTED
ALPHA-OH-ALPRAZOLAM: NOT DETECTED
ALPHA-OH-MIDAZOLAM, URINE: NOT DETECTED
ALPRAZOLAM: NOT DETECTED
AMPHET UR QL SCN: NOT DETECTED
BARBITURATES: NEGATIVE
BENZOYLECGONINE: NEGATIVE
BUPRENORPHINE: NOT DETECTED
CARISOPRODOL UR QL: NEGATIVE
CLONAZEPAM UR QL: NOT DETECTED
CODEINE: NOT DETECTED
CREAT UR-MCNC: 144.9 MG/DL (ref 20–400)
DIAZEPAM: NOT DETECTED
ETHYL GLUCURONIDE: NEGATIVE
FENTANYL UR QL: NOT DETECTED
GABAPENTIN: NOT DETECTED
HYDROCODONE UR QL: NOT DETECTED
HYDROMORPHONE: NOT DETECTED
LORAZEPAM UR QL: NOT DETECTED
MARIJUANA METABOLITE: ABNORMAL
MDA: NOT DETECTED
MDEA: NOT DETECTED
MDMA UR QL: NOT DETECTED
MEPERIDINE: NOT DETECTED
METHADONE: NEGATIVE
METHAMPHETAMINE: NOT DETECTED
METHYLPHENIDATE: NOT DETECTED
MIDAZOLAM UR QL SCN: NOT DETECTED
MORPHINE: NOT DETECTED
NALOXONE: NOT DETECTED
NORBUPRENORPHINE, FREE: NOT DETECTED
NORDIAZEPAM: NOT DETECTED
NORFENTANYL: NOT DETECTED
NORHYDROCODONE, URINE: NOT DETECTED
NOROXYCODONE: NOT DETECTED
NOROXYMORPHONE, URINE: NOT DETECTED
OXAZEPAM UR QL: NOT DETECTED
OXYCODONE UR QL: NOT DETECTED
OXYMORPHONE UR QL: NOT DETECTED
PAIN MANAGEMENT DRUG PANEL: ABNORMAL
PATHOLOGY STUDY: ABNORMAL
PCP: NEGATIVE
PHENTERMINE: NOT DETECTED
PREGABALIN: NOT DETECTED
TAPENTADOL, URINE: NOT DETECTED
TAPENTADOL-O-SULFATE, URINE: NOT DETECTED
TEMAZEPAM: NOT DETECTED
TRAMADOL: NEGATIVE
ZOLPIDEM: NOT DETECTED